# Patient Record
Sex: MALE | Race: WHITE | URBAN - METROPOLITAN AREA
[De-identification: names, ages, dates, MRNs, and addresses within clinical notes are randomized per-mention and may not be internally consistent; named-entity substitution may affect disease eponyms.]

---

## 2023-04-07 ENCOUNTER — OFFICE VISIT (OUTPATIENT)
Dept: FAMILY MEDICINE CLINIC | Age: 48
End: 2023-04-07

## 2023-04-09 PROBLEM — R09.81 NASAL CONGESTION: Status: ACTIVE | Noted: 2023-04-09

## 2023-04-09 PROBLEM — G25.2 RESTING TREMOR: Status: ACTIVE | Noted: 2023-04-09

## 2023-04-09 PROBLEM — R20.0 NUMBNESS AND TINGLING IN BOTH HANDS: Status: ACTIVE | Noted: 2023-04-09

## 2023-04-09 PROBLEM — R53.1 WEAKNESS: Status: ACTIVE | Noted: 2023-04-09

## 2023-04-09 PROBLEM — R20.2 NUMBNESS AND TINGLING IN BOTH HANDS: Status: ACTIVE | Noted: 2023-04-09

## 2023-05-01 ENCOUNTER — OFFICE VISIT (OUTPATIENT)
Dept: ENT CLINIC | Age: 48
End: 2023-05-01
Payer: COMMERCIAL

## 2023-05-01 VITALS
WEIGHT: 143 LBS | DIASTOLIC BLOOD PRESSURE: 89 MMHG | RESPIRATION RATE: 18 BRPM | BODY MASS INDEX: 18.95 KG/M2 | HEIGHT: 73 IN | SYSTOLIC BLOOD PRESSURE: 129 MMHG | HEART RATE: 105 BPM | OXYGEN SATURATION: 97 %

## 2023-05-01 DIAGNOSIS — J31.0 RHINITIS MEDICAMENTOSA: ICD-10-CM

## 2023-05-01 DIAGNOSIS — J34.2 DNS (DEVIATED NASAL SEPTUM): Primary | ICD-10-CM

## 2023-05-01 DIAGNOSIS — T48.5X5A RHINITIS MEDICAMENTOSA: ICD-10-CM

## 2023-05-01 PROCEDURE — 99203 OFFICE O/P NEW LOW 30 MIN: CPT | Performed by: OTOLARYNGOLOGY

## 2023-05-01 NOTE — PROGRESS NOTES
Subjective:    Tamanna Bermanr   56 y.o.   1975     New Patient Visit    History of Present Illness:    5/1/2023-[facility]    52-year-old male presents for chief complaint of nasal obstruction. Issues are primarily on the left side. This has been fairly lifelong. He does not complain of any rhinorrhea nor allergy type symptoms consistently. He has generally treated himself with phenylephrine nasal decongestant spray intermittently. No history of nasal trauma. He has had a fair amount of dental work done due to a history of crossbite. Review of Systems  Review of Systems   Constitutional:  Negative for chills and fever. HENT:  Positive for congestion. Negative for ear pain, hearing loss, nosebleeds and tinnitus. Eyes:  Negative for blurred vision and double vision. Respiratory:  Negative for cough, sputum production and shortness of breath. Cardiovascular:  Negative for chest pain and palpitations. Gastrointestinal:  Negative for heartburn, nausea and vomiting. Musculoskeletal:  Negative for joint pain and neck pain. Skin: Negative. Neurological:  Negative for dizziness, speech change, weakness and headaches. Endo/Heme/Allergies:  Negative for environmental allergies. Does not bruise/bleed easily. Psychiatric/Behavioral:  Negative for memory loss. The patient does not have insomnia. History reviewed. No pertinent past medical history. Past Surgical History:   Procedure Laterality Date    HX HERNIA REPAIR        Family History   Problem Relation Age of Onset    No Known Problems Mother     Hypertension Father      Social History     Tobacco Use    Smoking status: Never    Smokeless tobacco: Never   Substance Use Topics    Alcohol use: Never      Prior to Admission medications    Medication Sig Start Date End Date Taking? Authorizing Provider   metFORMIN (GLUCOPHAGE) 500 mg tablet Take 1 Tablet by mouth two (2) times daily (with meals).  4/14/23  Yes Zeeshan Duarte Yu MD        No Known Allergies      Objective:     Visit Vitals  /89 (BP 1 Location: Left upper arm, BP Patient Position: Sitting, BP Cuff Size: Adult)   Pulse (!) 105   Resp 18   Ht 6' 1\" (1.854 m)   Wt 143 lb (64.9 kg)   SpO2 97%   BMI 18.87 kg/m²        Physical Exam  Vitals reviewed. Constitutional:       General: He is awake. Appearance: Normal appearance. He is normal weight. HENT:      Head: Normocephalic and atraumatic. Jaw: There is normal jaw occlusion. No trismus, tenderness or malocclusion. Salivary Glands: Right salivary gland is not diffusely enlarged or tender. Left salivary gland is not diffusely enlarged or tender. Right Ear: Hearing, tympanic membrane, ear canal and external ear normal.      Left Ear: Hearing, tympanic membrane, ear canal and external ear normal.      Nose: Septal deviation (Severe inferior septum and maxillary crest dysfunction to the left side obstructing the entire left nasal floor) present. No mucosal edema or rhinorrhea. Right Turbinates: Not enlarged, swollen or pale. Left Turbinates: Not enlarged, swollen or pale. Right Sinus: No maxillary sinus tenderness or frontal sinus tenderness. Left Sinus: No maxillary sinus tenderness or frontal sinus tenderness. Mouth/Throat:      Lips: Pink. Mouth: Mucous membranes are moist. No oral lesions. Dentition: Abnormal dentition. No gum lesions. Tongue: No lesions. Palate: No mass and lesions. Pharynx: Oropharynx is clear. Uvula midline. Tonsils: No tonsillar exudate. 0 on the right. 0 on the left. Eyes:      General: Vision grossly intact. Extraocular Movements: Extraocular movements intact. Right eye: No nystagmus. Left eye: No nystagmus. Pupils: Pupils are equal, round, and reactive to light. Neck:      Thyroid: No thyroid mass, thyromegaly or thyroid tenderness.       Trachea: Trachea and phonation normal. No tracheal tenderness. Cardiovascular:      Rate and Rhythm: Normal rate and regular rhythm. Pulmonary:      Effort: Pulmonary effort is normal.      Breath sounds: Normal breath sounds. No stridor. No wheezing. Musculoskeletal:         General: Normal range of motion. Cervical back: Normal range of motion. No edema or erythema. Lymphadenopathy:      Cervical: No cervical adenopathy. Skin:     General: Skin is warm and dry. Neurological:      General: No focal deficit present. Mental Status: He is alert and oriented to person, place, and time. Mental status is at baseline. Coordination: Romberg sign negative. Gait: Gait is intact. Psychiatric:         Mood and Affect: Mood normal.         Behavior: Behavior normal. Behavior is cooperative. Assessment/Plan:     Encounter Diagnoses   Name Primary? DNS (deviated nasal septum) Yes    Rhinitis medicamentosa      Patient with obvious significant left nasal septal deviation which is in line with his clinical symptoms. He is a good candidate for septoplasty and he will likely proceed. I have instructed him that he should stop using nasal decongestant spray for potential surgery and he plans to quit using in the next 2 to 3 weeks. Discussed risks/benefits of septoplasty/turbinate procedure with patient, handout given. Risks include bleeding, infection, septum perforation, continued nasal blockage. Questions answered. Orders Placed This Encounter    REFERRAL TO SURGERY           Thank you for referring this patient,    Isidoro Crisostomo MD, [address] [facility]    [address]  [facility]    [address] [address]  [place], [place]

## 2023-05-02 ENCOUNTER — OFFICE VISIT (OUTPATIENT)
Dept: FAMILY MEDICINE CLINIC | Age: 48
End: 2023-05-02

## 2023-05-02 VITALS
RESPIRATION RATE: 18 BRPM | BODY MASS INDEX: 19.22 KG/M2 | DIASTOLIC BLOOD PRESSURE: 70 MMHG | WEIGHT: 145 LBS | HEIGHT: 73 IN | OXYGEN SATURATION: 97 % | SYSTOLIC BLOOD PRESSURE: 108 MMHG | TEMPERATURE: 98.2 F | HEART RATE: 72 BPM

## 2023-05-02 DIAGNOSIS — E11.65 TYPE 2 DIABETES MELLITUS WITH HYPERGLYCEMIA, WITHOUT LONG-TERM CURRENT USE OF INSULIN (HCC): Primary | ICD-10-CM

## 2023-05-02 DIAGNOSIS — R09.81 NASAL CONGESTION: ICD-10-CM

## 2023-05-02 DIAGNOSIS — G25.2 RESTING TREMOR: ICD-10-CM

## 2023-05-02 DIAGNOSIS — R29.898 WEAKNESS OF LEFT LOWER EXTREMITY: ICD-10-CM

## 2023-05-02 RX ORDER — AMOXICILLIN 500 MG/1
500 TABLET, FILM COATED ORAL
COMMUNITY

## 2023-05-02 RX ORDER — GLIPIZIDE 5 MG/1
5 TABLET ORAL 2 TIMES DAILY
Qty: 90 TABLET | Refills: 2 | Status: SHIPPED | OUTPATIENT
Start: 2023-05-02

## 2023-05-02 RX ORDER — METFORMIN HYDROCHLORIDE 1000 MG/1
1000 TABLET ORAL 2 TIMES DAILY WITH MEALS
Qty: 90 TABLET | Refills: 2 | Status: SHIPPED | OUTPATIENT
Start: 2023-05-02

## 2023-05-03 LAB
CREAT UR-MCNC: 58 MG/DL
MICROALBUMIN UR-MCNC: 0.53 MG/DL
MICROALBUMIN/CREAT UR-RTO: 9 MG/G (ref 0–30)

## 2023-05-05 LAB — GAD65 AB SER-ACNC: <5 U/ML (ref 0–5)

## 2023-05-19 ENCOUNTER — NURSE ONLY (OUTPATIENT)
Age: 48
End: 2023-05-19

## 2023-05-19 DIAGNOSIS — E11.65 TYPE 2 DIABETES MELLITUS WITH HYPERGLYCEMIA, WITHOUT LONG-TERM CURRENT USE OF INSULIN (HCC): Primary | ICD-10-CM

## 2023-05-23 ENCOUNTER — OFFICE VISIT (OUTPATIENT)
Age: 48
End: 2023-05-23

## 2023-05-23 VITALS
HEIGHT: 73 IN | TEMPERATURE: 98.3 F | HEART RATE: 83 BPM | WEIGHT: 141.8 LBS | OXYGEN SATURATION: 98 % | BODY MASS INDEX: 18.79 KG/M2 | DIASTOLIC BLOOD PRESSURE: 80 MMHG | SYSTOLIC BLOOD PRESSURE: 112 MMHG

## 2023-05-23 DIAGNOSIS — N52.01 ERECTILE DYSFUNCTION DUE TO ARTERIAL INSUFFICIENCY: ICD-10-CM

## 2023-05-23 DIAGNOSIS — E11.65 TYPE 2 DIABETES MELLITUS WITH HYPERGLYCEMIA, WITHOUT LONG-TERM CURRENT USE OF INSULIN (HCC): Primary | ICD-10-CM

## 2023-05-23 LAB — GLUCOSE DOSE-GTT, POC: 216

## 2023-05-23 RX ORDER — GLIPIZIDE 5 MG/1
5 TABLET ORAL 2 TIMES DAILY
COMMUNITY
Start: 2023-05-02

## 2023-05-23 RX ORDER — CHLORHEXIDINE GLUCONATE 0.12 MG/ML
RINSE ORAL
COMMUNITY
Start: 2023-05-01 | End: 2023-05-25 | Stop reason: ALTCHOICE

## 2023-05-23 RX ORDER — AMOXICILLIN 500 MG/1
TABLET, FILM COATED ORAL
COMMUNITY
Start: 2023-04-27 | End: 2023-05-25 | Stop reason: ALTCHOICE

## 2023-05-23 RX ORDER — HYDROCODONE BITARTRATE AND IBUPROFEN 7.5; 2 MG/1; MG/1
TABLET, FILM COATED ORAL
COMMUNITY
Start: 2023-04-20

## 2023-05-23 RX ORDER — SILDENAFIL 50 MG/1
50 TABLET, FILM COATED ORAL DAILY PRN
Qty: 30 TABLET | Refills: 0 | Status: SHIPPED | OUTPATIENT
Start: 2023-05-23

## 2023-05-23 SDOH — ECONOMIC STABILITY: INCOME INSECURITY: HOW HARD IS IT FOR YOU TO PAY FOR THE VERY BASICS LIKE FOOD, HOUSING, MEDICAL CARE, AND HEATING?: NOT HARD AT ALL

## 2023-05-23 SDOH — ECONOMIC STABILITY: FOOD INSECURITY: WITHIN THE PAST 12 MONTHS, YOU WORRIED THAT YOUR FOOD WOULD RUN OUT BEFORE YOU GOT MONEY TO BUY MORE.: NEVER TRUE

## 2023-05-23 SDOH — ECONOMIC STABILITY: FOOD INSECURITY: WITHIN THE PAST 12 MONTHS, THE FOOD YOU BOUGHT JUST DIDN'T LAST AND YOU DIDN'T HAVE MONEY TO GET MORE.: NEVER TRUE

## 2023-05-23 SDOH — ECONOMIC STABILITY: HOUSING INSECURITY
IN THE LAST 12 MONTHS, WAS THERE A TIME WHEN YOU DID NOT HAVE A STEADY PLACE TO SLEEP OR SLEPT IN A SHELTER (INCLUDING NOW)?: NO

## 2023-05-23 ASSESSMENT — PATIENT HEALTH QUESTIONNAIRE - PHQ9
SUM OF ALL RESPONSES TO PHQ QUESTIONS 1-9: 0
SUM OF ALL RESPONSES TO PHQ9 QUESTIONS 1 & 2: 0
2. FEELING DOWN, DEPRESSED OR HOPELESS: 0
SUM OF ALL RESPONSES TO PHQ QUESTIONS 1-9: 0
1. LITTLE INTEREST OR PLEASURE IN DOING THINGS: 0
SUM OF ALL RESPONSES TO PHQ QUESTIONS 1-9: 0
SUM OF ALL RESPONSES TO PHQ QUESTIONS 1-9: 0

## 2023-05-23 NOTE — PROGRESS NOTES
Identified pt with two pt identifiers(name and ). Reviewed record in preparation for visit and have obtained necessary documentation. Chief Complaint   Patient presents with    Diabetes        Health Maintenance Due   Topic    COVID-19 Vaccine (1)    Pneumococcal 0-64 years Vaccine (1 - PCV)    HIV screen     Hepatitis B vaccine (1 of 3 - Risk 3-dose series)    DTaP/Tdap/Td vaccine (1 - Tdap)    Colorectal Cancer Screen        Vitals:    23 1612   BP: 112/80   Site: Right Upper Arm   Position: Sitting   Cuff Size: Large Adult   Pulse: 83   Temp: 98.3 °F (36.8 °C)   TempSrc: Temporal   SpO2: 98%   Weight: 141 lb 12.8 oz (64.3 kg)   Height: 6' 1\" (1.854 m)        Coordination of Care Questionnaire:  :   1) Have you been to an emergency room, urgent care, or hospitalized since your last visit? If yes, where when, and reason for visit? No    2. Have seen or consulted any other health care provider since your last visit? If yes, where when, and reason for visit? No      3)   Patient is accompanied by self I have received verbal consent from Carmen Hewitt to discuss any/all medical information while they are present in the room.

## 2023-05-23 NOTE — PATIENT INSTRUCTIONS
- start checking your blood sugar daily. Check first thing in the morning and 1-2 hours after meals.   - record your these values in a log book and bring this with you to future appointments  - goal values are approximately  in the morning and less than 180 1-2 hours after eating.  - continue to work on diabetic diet goals  - work to eliminate concentrated sugar in your diet [facility] dew)

## 2023-05-23 NOTE — PROGRESS NOTES
SUBJECTIVE  Pito Mcdermott is an 56 y.o. male who presents for T2DM follow up. T2DM: Patient had metformin increased to 1,000mg BID and glipizide 5mg BID. - metformin: patient reports increased diarrhea with the increased dose. He is amenable to continuing the medication at this time  - glipizide: taking as prescribed without issue or concerns  - he has enrolled in a weekly group class with diabetes education  - POC glucose 216. Last ate at 12:30. Had mountain dew ~6oz prior to appointment. ED:  - patient reports gradually onset inability to maintain an erection  - he is amenable to trial of PDE5 inhibitor medication    Allergies   No Known Allergies      Medications   Current Outpatient Medications   Medication Sig    blood glucose monitor kit and supplies Dispense sufficient amount for indicated testing frequency plus additional to accommodate PRN testing needs. Dispense all needed supplies to include: monitor, strips, lancing device, lancets, control solutions, alcohol swabs. glipiZIDE (GLUCOTROL) 5 MG tablet Take 1 tablet by mouth 2 times daily    HYDROcodone-ibuprofen (VICOPROFEN) 7.5-200 MG per tablet TAKE 1 TABLET BY MOUTH EVERY 6 HOURS AS NEEDED FOR PAIN NOT TO EXCEED 5 TABLETS IN 24 HOURS    metFORMIN (GLUCOPHAGE) 1000 MG tablet Take 1 tablet by mouth 2 times daily (with meals)    sildenafil (VIAGRA) 50 MG tablet Take 1 tablet by mouth daily as needed for Erectile Dysfunction    amoxicillin (AMOXIL) 500 MG tablet TAKE 1 TABLET BY MOUTH FOUR TIMES A DAY UNTIL ALL ARE TAKEN (Patient not taking: Reported on 5/23/2023)    chlorhexidine (PERIDEX) 0.12 % solution RINSE MOUTH WITH 15ML (1 CAPFUL) FOR 30 SECONDS IN MORNING AND EVENING AFTER MEALS, THEN SPIT (Patient not taking: Reported on 5/23/2023)     No current facility-administered medications for this visit. Past surgical, medical and social history reviewed and updated as relevant to presenting concerns.      ROS: See HPI      OBJECTIVE  BP

## 2023-06-21 DIAGNOSIS — E11.65 TYPE 2 DIABETES MELLITUS WITH HYPERGLYCEMIA, WITHOUT LONG-TERM CURRENT USE OF INSULIN (HCC): Primary | ICD-10-CM

## 2023-06-21 NOTE — PROGRESS NOTES
Patient switched back to metformin 500mg from 1000mg BID secondary to GI side-effects. [employer] message sent to patient. Upcoming appointment with Dr. Bronwyn Carreon. Will touch base with Dr. Bronwyn Carreon with recommendation to consider starting insulin.     Nicky Hurtado MD

## 2023-06-23 ENCOUNTER — NURSE ONLY (OUTPATIENT)
Age: 48
End: 2023-06-23

## 2023-06-23 ENCOUNTER — HOSPITAL ENCOUNTER (OUTPATIENT)
Facility: HOSPITAL | Age: 48
Discharge: HOME | End: 2023-06-23
Payer: COMMERCIAL

## 2023-06-23 VITALS
HEIGHT: 72 IN | SYSTOLIC BLOOD PRESSURE: 110 MMHG | TEMPERATURE: 97.5 F | DIASTOLIC BLOOD PRESSURE: 80 MMHG | HEART RATE: 82 BPM | RESPIRATION RATE: 16 BRPM | WEIGHT: 140.21 LBS | BODY MASS INDEX: 18.99 KG/M2

## 2023-06-23 DIAGNOSIS — E11.65 TYPE 2 DIABETES MELLITUS WITH HYPERGLYCEMIA, WITHOUT LONG-TERM CURRENT USE OF INSULIN (HCC): Primary | ICD-10-CM

## 2023-06-23 LAB
ANION GAP SERPL CALC-SCNC: 5 MMOL/L (ref 5–15)
BUN SERPL-MCNC: 15 MG/DL (ref 6–20)
BUN/CREAT SERPL: 18 (ref 12–20)
CALCIUM SERPL-MCNC: 9.6 MG/DL (ref 8.5–10.1)
CHLORIDE SERPL-SCNC: 107 MMOL/L (ref 97–108)
CO2 SERPL-SCNC: 28 MMOL/L (ref 21–32)
CREAT SERPL-MCNC: 0.85 MG/DL (ref 0.7–1.3)
EKG ATRIAL RATE: 66 BPM
EKG DIAGNOSIS: NORMAL
EKG P AXIS: 58 DEGREES
EKG P-R INTERVAL: 178 MS
EKG Q-T INTERVAL: 372 MS
EKG QRS DURATION: 100 MS
EKG QTC CALCULATION (BAZETT): 389 MS
EKG R AXIS: 71 DEGREES
EKG T AXIS: 60 DEGREES
EKG VENTRICULAR RATE: 66 BPM
GLUCOSE SERPL-MCNC: 328 MG/DL (ref 65–100)
POTASSIUM SERPL-SCNC: 4.1 MMOL/L (ref 3.5–5.1)
SODIUM SERPL-SCNC: 140 MMOL/L (ref 136–145)

## 2023-06-23 PROCEDURE — 36415 COLL VENOUS BLD VENIPUNCTURE: CPT

## 2023-06-23 PROCEDURE — 80048 BASIC METABOLIC PNL TOTAL CA: CPT

## 2023-06-23 PROCEDURE — 93005 ELECTROCARDIOGRAM TRACING: CPT | Performed by: OTOLARYNGOLOGY

## 2023-06-23 RX ORDER — GLIPIZIDE 5 MG/1
5 TABLET, FILM COATED, EXTENDED RELEASE ORAL 2 TIMES DAILY
COMMUNITY

## 2023-06-23 RX ORDER — IBUPROFEN 200 MG
200 TABLET ORAL AS NEEDED
COMMUNITY
End: 2023-06-30

## 2023-06-23 NOTE — PROGRESS NOTES
[employer] [facility] Diabetes [employer]  Diabetes Self-Management Education & Support Program  Encounter Note      SUMMARY  Diabetes self-care management training was completed related to taking medications and physical activity. The participant will return on June 30 to continue DSMES related to healthy coping and problem solving. The participant did identify SMART Goal(s) and will practice knowledge and skills related to being active and medications to improve diabetes self-management. EVALUATION:  Participant's wife attended class today. Expressed understanding of diabetes medications, signs, symptoms, and treatment of hypoglycemia. Stated that glipizide was stopped and metformin dose was reduced. States understanding of the positive impact physical activity has on BG. Participated in physical activity during class. Stated that he is active around the house and gets in about 40 miles of steps at [employer]. RECOMMENDATIONS:  Continue working with provider on medication dosing  Continue current monitoring frequency  Consider adding structured physical activity discussed in class     TOPICS DISCUSSED TODAY:  HOW DO MY DIABETES MEDICATIONS WORK? [place]? 60      Next provider visit is scheduled for 6/30/23 with Dr. Paris Ricardo       SMART GOAL(S)  Consume 60g of CHO or less at breakfast Monday - Thursday of next week. ACHIEVEMENT OF GOAL(S) : %       DATE DSMES TOPIC EVALUATION     6/23/2023 HOW DO MY DIABETES MEDICATIONS WORK?    Type 1 medications & methods   Insulin injections   Injection sites   Type 2 medications   Oral agents   GLP-1 agonists   Hypoglycemia symptoms & treatment   Glucagon emergency kits   General guidance regarding insulin use whether Type 1, 2 or gestational diabetes   Storage of insulin   Disposal    Traveling with medications   Barriers to medication adherence      The participant   Can describe the expected action & side effects of

## 2023-06-23 NOTE — PERIOP NOTE
[address] [address] [facility]   MAIN OR                                  [phone]   MAIN PRE OP                          [phone]                                                                                AMBULATORY PRE OP          [phone]  PRE-ADMISSION TESTING    [phone]   Surgery Date:  Monday 7/3/23       Is surgery arrival time given by surgeon? YES  NO  If NO, [facility] staff will call you between 3 and 7pm the day before your surgery with your arrival time. (If your surgery is on a Monday, we will call you the Friday before.)    Call [phone] after 7pm Monday-Friday if you did not receive this call. INSTRUCTIONS BEFORE YOUR SURGERY   When You  Arrive Arrive at the 2nd [facility] on the day of your surgery  Have your insurance card, photo ID, and any copayment (if needed)   Food   and   Drink NO food or drink after midnight the night before surgery    This means NO water, gum, mints, coffee, juice, etc.  No alcohol (beer, wine, liquor) 24 hours before and after surgery   Medications to   TAKE   Morning of Surgery MEDICATIONS TO TAKE THE MORNING OF SURGERY WITH A SIP OF WATER:   Hold Sildenafil for 48 hours before surgery  Check your glucose in am day of surgery,if glucose is 80 you can take over the counter glucose tablets    Medications  To  STOP      7 days before surgery Non-Steroidal anti-inflammatory Drugs (NSAID's): for example, Ibuprofen (Advil, Motrin), Naproxen (Aleve)  Aspirin, if taking for pain   Herbal supplements, vitamins, and fish oil  Other:  (Pain medications not listed above, including Tylenol may be taken)   Blood  Thinners If you take  Aspirin, Plavix, Coumadin, or any blood-thinning or anti-blood clot medicine, talk to the doctor who prescribed the medications for pre-operative instructions.    Bathing Clothing  Jewelry  Valuables     If you shower the morning of surgery,

## 2023-06-26 ENCOUNTER — OFFICE VISIT (OUTPATIENT)
Age: 48
End: 2023-06-26
Payer: COMMERCIAL

## 2023-06-26 VITALS
HEIGHT: 72 IN | WEIGHT: 140 LBS | DIASTOLIC BLOOD PRESSURE: 78 MMHG | SYSTOLIC BLOOD PRESSURE: 118 MMHG | OXYGEN SATURATION: 97 % | HEART RATE: 108 BPM | RESPIRATION RATE: 17 BRPM | BODY MASS INDEX: 18.96 KG/M2

## 2023-06-26 DIAGNOSIS — J34.2 DEVIATED NASAL SEPTUM: Primary | ICD-10-CM

## 2023-06-26 DIAGNOSIS — J31.0 CHRONIC RHINITIS: ICD-10-CM

## 2023-06-26 PROCEDURE — 99213 OFFICE O/P EST LOW 20 MIN: CPT | Performed by: OTOLARYNGOLOGY

## 2023-06-30 ENCOUNTER — ANESTHESIA EVENT (OUTPATIENT)
Facility: HOSPITAL | Age: 48
End: 2023-06-30
Payer: COMMERCIAL

## 2023-06-30 ENCOUNTER — OFFICE VISIT (OUTPATIENT)
Age: 48
End: 2023-06-30
Payer: COMMERCIAL

## 2023-06-30 ENCOUNTER — NURSE ONLY (OUTPATIENT)
Age: 48
End: 2023-06-30

## 2023-06-30 VITALS
HEART RATE: 82 BPM | SYSTOLIC BLOOD PRESSURE: 109 MMHG | RESPIRATION RATE: 16 BRPM | TEMPERATURE: 98 F | OXYGEN SATURATION: 97 % | WEIGHT: 136 LBS | DIASTOLIC BLOOD PRESSURE: 70 MMHG | HEIGHT: 72 IN | BODY MASS INDEX: 18.42 KG/M2

## 2023-06-30 DIAGNOSIS — E11.65 TYPE 2 DIABETES MELLITUS WITH HYPERGLYCEMIA, WITHOUT LONG-TERM CURRENT USE OF INSULIN (HCC): Primary | ICD-10-CM

## 2023-06-30 LAB — HBA1C MFR BLD: 9.3 %

## 2023-06-30 PROCEDURE — 99214 OFFICE O/P EST MOD 30 MIN: CPT

## 2023-06-30 PROCEDURE — 83036 HEMOGLOBIN GLYCOSYLATED A1C: CPT

## 2023-06-30 PROCEDURE — 3046F HEMOGLOBIN A1C LEVEL >9.0%: CPT

## 2023-06-30 RX ORDER — PIOGLITAZONEHYDROCHLORIDE 15 MG/1
15 TABLET ORAL DAILY
Qty: 30 TABLET | Refills: 3 | Status: ON HOLD | OUTPATIENT
Start: 2023-06-30 | End: 2023-07-03 | Stop reason: HOSPADM

## 2023-06-30 RX ORDER — METFORMIN HYDROCHLORIDE 500 MG/1
1000 TABLET, EXTENDED RELEASE ORAL
Qty: 180 TABLET | Refills: 1 | Status: SHIPPED | OUTPATIENT
Start: 2023-06-30

## 2023-06-30 ASSESSMENT — ANXIETY QUESTIONNAIRES
GAD7 TOTAL SCORE: 3
4. TROUBLE RELAXING: 1
6. BECOMING EASILY ANNOYED OR IRRITABLE: 0
IF YOU CHECKED OFF ANY PROBLEMS ON THIS QUESTIONNAIRE, HOW DIFFICULT HAVE THESE PROBLEMS MADE IT FOR YOU TO DO YOUR WORK, TAKE CARE OF THINGS AT HOME, OR GET ALONG WITH OTHER PEOPLE: SOMEWHAT DIFFICULT
5. BEING SO RESTLESS THAT IT IS HARD TO SIT STILL: 0
3. WORRYING TOO MUCH ABOUT DIFFERENT THINGS: 1
2. NOT BEING ABLE TO STOP OR CONTROL WORRYING: 0
7. FEELING AFRAID AS IF SOMETHING AWFUL MIGHT HAPPEN: 0
1. FEELING NERVOUS, ANXIOUS, OR ON EDGE: 1

## 2023-06-30 ASSESSMENT — PATIENT HEALTH QUESTIONNAIRE - PHQ9
SUM OF ALL RESPONSES TO PHQ QUESTIONS 1-9: 10
5. POOR APPETITE OR OVEREATING: 0
8. MOVING OR SPEAKING SO SLOWLY THAT OTHER PEOPLE COULD HAVE NOTICED. OR THE OPPOSITE, BEING SO FIGETY OR RESTLESS THAT YOU HAVE BEEN MOVING AROUND A LOT MORE THAN USUAL: 0
SUM OF ALL RESPONSES TO PHQ9 QUESTIONS 1 & 2: 3
6. FEELING BAD ABOUT YOURSELF - OR THAT YOU ARE A FAILURE OR HAVE LET YOURSELF OR YOUR FAMILY DOWN: 2
SUM OF ALL RESPONSES TO PHQ QUESTIONS 1-9: 10
1. LITTLE INTEREST OR PLEASURE IN DOING THINGS: 2
10. IF YOU CHECKED OFF ANY PROBLEMS, HOW DIFFICULT HAVE THESE PROBLEMS MADE IT FOR YOU TO DO YOUR WORK, TAKE CARE OF THINGS AT HOME, OR GET ALONG WITH OTHER PEOPLE: 0
SUM OF ALL RESPONSES TO PHQ QUESTIONS 1-9: 10
2. FEELING DOWN, DEPRESSED OR HOPELESS: 1
4. FEELING TIRED OR HAVING LITTLE ENERGY: 3
3. TROUBLE FALLING OR STAYING ASLEEP: 2
SUM OF ALL RESPONSES TO PHQ QUESTIONS 1-9: 10
9. THOUGHTS THAT YOU WOULD BE BETTER OFF DEAD, OR OF HURTING YOURSELF: 0
7. TROUBLE CONCENTRATING ON THINGS, SUCH AS READING THE NEWSPAPER OR WATCHING TELEVISION: 0

## 2023-07-03 ENCOUNTER — HOSPITAL ENCOUNTER (OUTPATIENT)
Facility: HOSPITAL | Age: 48
Setting detail: OUTPATIENT SURGERY
Discharge: HOME | End: 2023-07-03
Attending: OTOLARYNGOLOGY | Admitting: OTOLARYNGOLOGY
Payer: COMMERCIAL

## 2023-07-03 ENCOUNTER — ANESTHESIA (OUTPATIENT)
Facility: HOSPITAL | Age: 48
End: 2023-07-03
Payer: COMMERCIAL

## 2023-07-03 VITALS
RESPIRATION RATE: 14 BRPM | HEIGHT: 72 IN | TEMPERATURE: 97.8 F | HEART RATE: 61 BPM | OXYGEN SATURATION: 100 % | BODY MASS INDEX: 18.3 KG/M2 | DIASTOLIC BLOOD PRESSURE: 85 MMHG | WEIGHT: 135.14 LBS | SYSTOLIC BLOOD PRESSURE: 145 MMHG

## 2023-07-03 DIAGNOSIS — R09.81 NASAL CONGESTION: Primary | ICD-10-CM

## 2023-07-03 LAB
GLUCOSE BLD STRIP.AUTO-MCNC: 142 MG/DL (ref 65–117)
GLUCOSE BLD STRIP.AUTO-MCNC: 159 MG/DL (ref 65–117)
SERVICE CMNT-IMP: ABNORMAL
SERVICE CMNT-IMP: ABNORMAL

## 2023-07-03 PROCEDURE — 2580000003 HC RX 258: Performed by: ANESTHESIOLOGY

## 2023-07-03 PROCEDURE — 3700000001 HC ADD 15 MINUTES (ANESTHESIA): Performed by: OTOLARYNGOLOGY

## 2023-07-03 PROCEDURE — 6360000002 HC RX W HCPCS: Performed by: OTOLARYNGOLOGY

## 2023-07-03 PROCEDURE — 6360000002 HC RX W HCPCS: Performed by: NURSE ANESTHETIST, CERTIFIED REGISTERED

## 2023-07-03 PROCEDURE — 2500000003 HC RX 250 WO HCPCS: Performed by: OTOLARYNGOLOGY

## 2023-07-03 PROCEDURE — 3700000000 HC ANESTHESIA ATTENDED CARE: Performed by: OTOLARYNGOLOGY

## 2023-07-03 PROCEDURE — 3600000013 HC SURGERY LEVEL 3 ADDTL 15MIN: Performed by: OTOLARYNGOLOGY

## 2023-07-03 PROCEDURE — 7100000010 HC PHASE II RECOVERY - FIRST 15 MIN: Performed by: OTOLARYNGOLOGY

## 2023-07-03 PROCEDURE — 82962 GLUCOSE BLOOD TEST: CPT

## 2023-07-03 PROCEDURE — 7100000011 HC PHASE II RECOVERY - ADDTL 15 MIN: Performed by: OTOLARYNGOLOGY

## 2023-07-03 PROCEDURE — 30802 ABLATE INF TURBINATE SUBMUC: CPT | Performed by: OTOLARYNGOLOGY

## 2023-07-03 PROCEDURE — 3600000003 HC SURGERY LEVEL 3 BASE: Performed by: OTOLARYNGOLOGY

## 2023-07-03 PROCEDURE — 2720000010 HC SURG SUPPLY STERILE: Performed by: OTOLARYNGOLOGY

## 2023-07-03 PROCEDURE — 2580000003 HC RX 258: Performed by: OTOLARYNGOLOGY

## 2023-07-03 PROCEDURE — 7100000001 HC PACU RECOVERY - ADDTL 15 MIN: Performed by: OTOLARYNGOLOGY

## 2023-07-03 PROCEDURE — 30520 REPAIR OF NASAL SEPTUM: CPT | Performed by: OTOLARYNGOLOGY

## 2023-07-03 PROCEDURE — 7100000000 HC PACU RECOVERY - FIRST 15 MIN: Performed by: OTOLARYNGOLOGY

## 2023-07-03 PROCEDURE — 2709999900 HC NON-CHARGEABLE SUPPLY: Performed by: OTOLARYNGOLOGY

## 2023-07-03 PROCEDURE — 2500000003 HC RX 250 WO HCPCS: Performed by: NURSE ANESTHETIST, CERTIFIED REGISTERED

## 2023-07-03 RX ORDER — DEXAMETHASONE SODIUM PHOSPHATE 4 MG/ML
INJECTION, SOLUTION INTRA-ARTICULAR; INTRALESIONAL; INTRAMUSCULAR; INTRAVENOUS; SOFT TISSUE PRN
Status: DISCONTINUED | OUTPATIENT
Start: 2023-07-03 | End: 2023-07-03 | Stop reason: SDUPTHER

## 2023-07-03 RX ORDER — SODIUM CHLORIDE, SODIUM LACTATE, POTASSIUM CHLORIDE, CALCIUM CHLORIDE 600; 310; 30; 20 MG/100ML; MG/100ML; MG/100ML; MG/100ML
INJECTION, SOLUTION INTRAVENOUS CONTINUOUS
Status: DISCONTINUED | OUTPATIENT
Start: 2023-07-03 | End: 2023-07-03 | Stop reason: HOSPADM

## 2023-07-03 RX ORDER — ROCURONIUM BROMIDE 10 MG/ML
INJECTION, SOLUTION INTRAVENOUS PRN
Status: DISCONTINUED | OUTPATIENT
Start: 2023-07-03 | End: 2023-07-03 | Stop reason: SDUPTHER

## 2023-07-03 RX ORDER — CEPHALEXIN 500 MG/1
500 CAPSULE ORAL 2 TIMES DAILY
Qty: 14 CAPSULE | Refills: 0 | Status: SHIPPED | OUTPATIENT
Start: 2023-07-03 | End: 2023-07-10

## 2023-07-03 RX ORDER — HYDROCODONE BITARTRATE AND ACETAMINOPHEN 5; 325 MG/1; MG/1
1 TABLET ORAL EVERY 4 HOURS PRN
Qty: 20 TABLET | Refills: 0 | Status: SHIPPED | OUTPATIENT
Start: 2023-07-03 | End: 2023-07-06

## 2023-07-03 RX ORDER — DIPHENHYDRAMINE HYDROCHLORIDE 50 MG/ML
12.5 INJECTION INTRAMUSCULAR; INTRAVENOUS
Status: DISCONTINUED | OUTPATIENT
Start: 2023-07-03 | End: 2023-07-03 | Stop reason: HOSPADM

## 2023-07-03 RX ORDER — ONDANSETRON 2 MG/ML
4 INJECTION INTRAMUSCULAR; INTRAVENOUS EVERY 6 HOURS PRN
Status: DISCONTINUED | OUTPATIENT
Start: 2023-07-03 | End: 2023-07-03 | Stop reason: HOSPADM

## 2023-07-03 RX ORDER — OXYCODONE HYDROCHLORIDE 5 MG/1
5 TABLET ORAL EVERY 4 HOURS PRN
Status: DISCONTINUED | OUTPATIENT
Start: 2023-07-03 | End: 2023-07-03 | Stop reason: HOSPADM

## 2023-07-03 RX ORDER — PROPOFOL 10 MG/ML
INJECTION, EMULSION INTRAVENOUS CONTINUOUS PRN
Status: DISCONTINUED | OUTPATIENT
Start: 2023-07-03 | End: 2023-07-03 | Stop reason: SDUPTHER

## 2023-07-03 RX ORDER — PROPOFOL 10 MG/ML
INJECTION, EMULSION INTRAVENOUS PRN
Status: DISCONTINUED | OUTPATIENT
Start: 2023-07-03 | End: 2023-07-03 | Stop reason: SDUPTHER

## 2023-07-03 RX ORDER — GLYCOPYRROLATE 0.2 MG/ML
INJECTION INTRAMUSCULAR; INTRAVENOUS PRN
Status: DISCONTINUED | OUTPATIENT
Start: 2023-07-03 | End: 2023-07-03 | Stop reason: SDUPTHER

## 2023-07-03 RX ORDER — FENTANYL CITRATE 50 UG/ML
INJECTION, SOLUTION INTRAMUSCULAR; INTRAVENOUS PRN
Status: DISCONTINUED | OUTPATIENT
Start: 2023-07-03 | End: 2023-07-03 | Stop reason: SDUPTHER

## 2023-07-03 RX ORDER — FENTANYL CITRATE 50 UG/ML
100 INJECTION, SOLUTION INTRAMUSCULAR; INTRAVENOUS
Status: DISCONTINUED | OUTPATIENT
Start: 2023-07-03 | End: 2023-07-03 | Stop reason: HOSPADM

## 2023-07-03 RX ORDER — ONDANSETRON 4 MG/1
4 TABLET, ORALLY DISINTEGRATING ORAL EVERY 8 HOURS PRN
Status: DISCONTINUED | OUTPATIENT
Start: 2023-07-03 | End: 2023-07-03 | Stop reason: HOSPADM

## 2023-07-03 RX ORDER — ACETAMINOPHEN 325 MG/1
650 TABLET ORAL EVERY 4 HOURS PRN
Status: DISCONTINUED | OUTPATIENT
Start: 2023-07-03 | End: 2023-07-03 | Stop reason: HOSPADM

## 2023-07-03 RX ORDER — LIDOCAINE HYDROCHLORIDE AND EPINEPHRINE BITARTRATE 20; .01 MG/ML; MG/ML
INJECTION, SOLUTION SUBCUTANEOUS PRN
Status: DISCONTINUED | OUTPATIENT
Start: 2023-07-03 | End: 2023-07-03 | Stop reason: HOSPADM

## 2023-07-03 RX ORDER — LIDOCAINE HYDROCHLORIDE 10 MG/ML
1 INJECTION, SOLUTION EPIDURAL; INFILTRATION; INTRACAUDAL; PERINEURAL
Status: DISCONTINUED | OUTPATIENT
Start: 2023-07-03 | End: 2023-07-03 | Stop reason: HOSPADM

## 2023-07-03 RX ORDER — LIDOCAINE HYDROCHLORIDE 20 MG/ML
INJECTION, SOLUTION EPIDURAL; INFILTRATION; INTRACAUDAL; PERINEURAL PRN
Status: DISCONTINUED | OUTPATIENT
Start: 2023-07-03 | End: 2023-07-03 | Stop reason: SDUPTHER

## 2023-07-03 RX ORDER — ONDANSETRON 2 MG/ML
INJECTION INTRAMUSCULAR; INTRAVENOUS PRN
Status: DISCONTINUED | OUTPATIENT
Start: 2023-07-03 | End: 2023-07-03 | Stop reason: SDUPTHER

## 2023-07-03 RX ORDER — SODIUM CHLORIDE 9 MG/ML
INJECTION, SOLUTION INTRAVENOUS PRN
Status: DISCONTINUED | OUTPATIENT
Start: 2023-07-03 | End: 2023-07-03 | Stop reason: HOSPADM

## 2023-07-03 RX ORDER — SODIUM CHLORIDE 0.9 % (FLUSH) 0.9 %
5-40 SYRINGE (ML) INJECTION PRN
Status: DISCONTINUED | OUTPATIENT
Start: 2023-07-03 | End: 2023-07-03 | Stop reason: HOSPADM

## 2023-07-03 RX ORDER — MIDAZOLAM HYDROCHLORIDE 2 MG/2ML
2 INJECTION, SOLUTION INTRAMUSCULAR; INTRAVENOUS
Status: DISCONTINUED | OUTPATIENT
Start: 2023-07-03 | End: 2023-07-03 | Stop reason: HOSPADM

## 2023-07-03 RX ORDER — DEXMEDETOMIDINE HYDROCHLORIDE 100 UG/ML
INJECTION, SOLUTION INTRAVENOUS PRN
Status: DISCONTINUED | OUTPATIENT
Start: 2023-07-03 | End: 2023-07-03 | Stop reason: SDUPTHER

## 2023-07-03 RX ORDER — PHENYLEPHRINE HCL IN 0.9% NACL 0.4MG/10ML
SYRINGE (ML) INTRAVENOUS PRN
Status: DISCONTINUED | OUTPATIENT
Start: 2023-07-03 | End: 2023-07-03 | Stop reason: SDUPTHER

## 2023-07-03 RX ORDER — MIDAZOLAM HYDROCHLORIDE 1 MG/ML
INJECTION INTRAMUSCULAR; INTRAVENOUS PRN
Status: DISCONTINUED | OUTPATIENT
Start: 2023-07-03 | End: 2023-07-03 | Stop reason: SDUPTHER

## 2023-07-03 RX ORDER — OXYCODONE HYDROCHLORIDE 5 MG/1
10 TABLET ORAL EVERY 4 HOURS PRN
Status: DISCONTINUED | OUTPATIENT
Start: 2023-07-03 | End: 2023-07-03 | Stop reason: HOSPADM

## 2023-07-03 RX ORDER — ONDANSETRON 2 MG/ML
4 INJECTION INTRAMUSCULAR; INTRAVENOUS
Status: DISCONTINUED | OUTPATIENT
Start: 2023-07-03 | End: 2023-07-03 | Stop reason: HOSPADM

## 2023-07-03 RX ORDER — NEOSTIGMINE METHYLSULFATE 1 MG/ML
INJECTION, SOLUTION INTRAVENOUS PRN
Status: DISCONTINUED | OUTPATIENT
Start: 2023-07-03 | End: 2023-07-03 | Stop reason: SDUPTHER

## 2023-07-03 RX ORDER — SODIUM CHLORIDE 0.9 % (FLUSH) 0.9 %
5-40 SYRINGE (ML) INJECTION EVERY 12 HOURS SCHEDULED
Status: DISCONTINUED | OUTPATIENT
Start: 2023-07-03 | End: 2023-07-03 | Stop reason: HOSPADM

## 2023-07-03 RX ADMIN — Medication 40 MCG: at 12:45

## 2023-07-03 RX ADMIN — GLYCOPYRROLATE 0.4 MG: 0.2 INJECTION INTRAMUSCULAR; INTRAVENOUS at 13:20

## 2023-07-03 RX ADMIN — ONDANSETRON HYDROCHLORIDE 4 MG: 2 SOLUTION INTRAMUSCULAR; INTRAVENOUS at 12:45

## 2023-07-03 RX ADMIN — DEXMEDETOMIDINE 10 MCG: 100 INJECTION, SOLUTION INTRAVENOUS at 13:11

## 2023-07-03 RX ADMIN — NEOSTIGMINE METHYLSULFATE 3 MG: 1 INJECTION, SOLUTION INTRAVENOUS at 13:20

## 2023-07-03 RX ADMIN — LIDOCAINE HYDROCHLORIDE 80 MG: 20 INJECTION, SOLUTION EPIDURAL; INFILTRATION; INTRACAUDAL; PERINEURAL at 12:36

## 2023-07-03 RX ADMIN — Medication 80 MCG: at 12:55

## 2023-07-03 RX ADMIN — DEXAMETHASONE SODIUM PHOSPHATE 8 MG: 4 INJECTION, SOLUTION INTRAMUSCULAR; INTRAVENOUS at 12:45

## 2023-07-03 RX ADMIN — Medication 80 MCG: at 13:16

## 2023-07-03 RX ADMIN — PROPOFOL 50 MCG/KG/MIN: 10 INJECTION, EMULSION INTRAVENOUS at 12:45

## 2023-07-03 RX ADMIN — ROCURONIUM BROMIDE 30 MG: 10 INJECTION INTRAVENOUS at 12:36

## 2023-07-03 RX ADMIN — MIDAZOLAM HYDROCHLORIDE 4 MG: 1 INJECTION, SOLUTION INTRAMUSCULAR; INTRAVENOUS at 12:27

## 2023-07-03 RX ADMIN — Medication 80 MCG: at 13:04

## 2023-07-03 RX ADMIN — DEXMEDETOMIDINE 10 MCG: 100 INJECTION, SOLUTION INTRAVENOUS at 12:47

## 2023-07-03 RX ADMIN — Medication 80 MCG: at 12:41

## 2023-07-03 RX ADMIN — FENTANYL CITRATE 150 MCG: 50 INJECTION, SOLUTION INTRAMUSCULAR; INTRAVENOUS at 12:36

## 2023-07-03 RX ADMIN — PROPOFOL 200 MG: 10 INJECTION, EMULSION INTRAVENOUS at 12:36

## 2023-07-03 RX ADMIN — SODIUM CHLORIDE, POTASSIUM CHLORIDE, SODIUM LACTATE AND CALCIUM CHLORIDE: 600; 310; 30; 20 INJECTION, SOLUTION INTRAVENOUS at 09:31

## 2023-07-03 RX ADMIN — CEFAZOLIN SODIUM 2000 MG: 1 POWDER, FOR SOLUTION INTRAMUSCULAR; INTRAVENOUS at 12:45

## 2023-07-03 ASSESSMENT — PAIN - FUNCTIONAL ASSESSMENT: PAIN_FUNCTIONAL_ASSESSMENT: 0-10

## 2023-07-03 ASSESSMENT — ENCOUNTER SYMPTOMS: SHORTNESS OF BREATH: 1

## 2023-07-03 NOTE — ANESTHESIA POSTPROCEDURE EVALUATION
Department of Anesthesiology  Postprocedure Note    Patient: Yane Adrian  MRN: [id number]  YOB: 1975  Date of evaluation: 7/3/2023      Procedure Summary     Date: 07/03/23 Room / Location: [facility]  / [facility]    Anesthesia Start: 1227 Anesthesia Stop: 1343    Procedure: SEPTOPLASTY, BILATERAL SUBMUCOUS RESECTION INFERIOR TURBINATES (Bilateral: Nose) Diagnosis:       Deviated nasal septum      Chronic rhinitis      (Deviated nasal septum [J34.2])      (Chronic rhinitis [J31.0])    Surgeons: Keli Rosen MD Responsible Provider: Navdeep Jackson DO    Anesthesia Type: General ASA Status: 3          Anesthesia Type: General    Feliz Phase I: Feliz Score: 9    Feliz Phase II:        Anesthesia Post Evaluation    Patient location during evaluation: PACU  Patient participation: complete - patient participated  Level of consciousness: awake  Pain score: 1 (Nose burning sensation)  Airway patency: patent  Nausea & Vomiting: no vomiting and no nausea  Complications: no  Cardiovascular status: hemodynamically stable  Respiratory status: acceptable  Hydration status: stable  Comments: Patient seen and examined. Ready for discharge from PACU.   Multimodal analgesia pain management approach

## 2023-07-03 NOTE — OP NOTE
Operative Note    Patient: Kya Brower  YOB: 1975  MRN: [id number]    Date of Procedure: 7/3/23     Pre-Op Diagnosis: Deviated nasal septum [J34.2]  Chronic rhinitis [J31.0]    Post-Op Diagnosis: Same as preoperative diagnosis. Procedure:  Septoplasty, bilateral submucous ablation inferior turbinates    Surgeon(s):  Michael Ludwig MD    Surgical Assistant: None    Anesthesia: General     Estimated Blood Loss (mL):  Minimal    Complications: None    Specimens: * No specimens in log *     Implants: * No implants in log *    Drains: * No LDAs found *    Findings: Refer to operative note    Indications: 50-year-old male presents with chronic nasal obstruction refractory to medical management. Clinical examination reveals severe deviation of nasal septum toward the left side. He is brought to the operating room for septoplasty and Coblation reduction of inferior turbinates. Detailed Description of Procedure:     Patient was brought to the operating room placed supine on the table. General endotracheal anesthesia was obtained and a timeout was performed. Patient was positioned and draped in the appropriate fashion for nasal surgery. A total of 8 mL of 1% lidocaine with 1-100,000 parts epinephrine was infiltrated into the nasal septal mucosa bilaterally. The nose was then packed bilaterally with oxymetazoline neuro patties. After several minutes the pledgets were removed and intranasal examination revealed significant left-sided nasal airway obstruction with deviation of the inferior mid septum. I made a hemitransfixion incision in the right nasal vestibule and then dissected the mucoperichondrial flap using [place] elevator exposing the deviation on the right side. A transcartilaginous incision was then made sparing approximately 1.5 to 2 cm of caudal septal strut. Mucoperichondrial flap was then elevated contralaterally helping to expose the leftward deviation.   Using a combination

## 2023-07-03 NOTE — INTERVAL H&P NOTE
Update History & Physical    H&P update    Patient examined at the bedside preoperatively. Heart -regular rate and rhythm, S1/S2  Lungs -clear to auscultation bilaterally    No other changes to prior H&P. Procedure again discussed in detail, risks and benefits explained, postoperative considerations discussed. All questions answered.       Electronically signed by Oliva Tinsley MD on 7/3/2023 at 12:08 PM

## 2023-07-10 ENCOUNTER — TELEPHONE (OUTPATIENT)
Age: 48
End: 2023-07-10

## 2023-07-10 ENCOUNTER — OFFICE VISIT (OUTPATIENT)
Age: 48
End: 2023-07-10

## 2023-07-10 VITALS
HEIGHT: 72 IN | RESPIRATION RATE: 17 BRPM | OXYGEN SATURATION: 98 % | SYSTOLIC BLOOD PRESSURE: 118 MMHG | WEIGHT: 135 LBS | DIASTOLIC BLOOD PRESSURE: 78 MMHG | HEART RATE: 117 BPM | BODY MASS INDEX: 18.28 KG/M2

## 2023-07-10 DIAGNOSIS — J34.2 DEVIATED NASAL SEPTUM: Primary | ICD-10-CM

## 2023-07-10 PROCEDURE — 99024 POSTOP FOLLOW-UP VISIT: CPT | Performed by: OTOLARYNGOLOGY

## 2023-07-10 NOTE — PROGRESS NOTES
Subjective:    Dinah Anderson   56 y.o.   1975     Follow-up visit    History of Present Illness:    5/1/2023-[facility]    43-year-old male presents for chief complaint of nasal obstruction. Issues are primarily on the left side. This has been fairly lifelong. He does not complain of any rhinorrhea nor allergy type symptoms consistently. He has generally treated himself with phenylephrine nasal decongestant spray intermittently. No history of nasal trauma. He has had a fair amount of dental work done due to a history of crossbite. 6/26/2023  6-week follow-up, patient is here to discuss septoplasty. Since his last visit he was diagnosed with diabetes, put on metformin and glipizide which she had difficulty tolerating high doses. His sugars were out of control for short time and now are back with better numbers for the past couple of days. 7/10/2023  He is 1 week postop septoplasty, still complaining of left nasal congestion    Review of Systems  Review of Systems   Constitutional:  Negative for chills and fever. HENT:  Positive for congestion. Negative for ear pain, hearing loss, nosebleeds and tinnitus. Eyes:  Negative for blurred vision and double vision. Respiratory:  Negative for cough, sputum production and shortness of breath. Cardiovascular:  Negative for chest pain and palpitations. Gastrointestinal:  Negative for heartburn, nausea and vomiting. Musculoskeletal:  Negative for joint pain and neck pain. Skin: Negative. Neurological:  Negative for dizziness, speech change, weakness and headaches. Endo/Heme/Allergies:  Negative for environmental allergies. Does not bruise/bleed easily. Psychiatric/Behavioral:  Negative for memory loss. The patient does not have insomnia. History reviewed. No pertinent past medical history.   Past Surgical History:   Procedure Laterality Date    HX HERNIA REPAIR        Family History   Problem Relation Age of Onset    No

## 2023-07-10 NOTE — TELEPHONE ENCOUNTER
Pt came in to request his prescription for pioglitazone (ACTOS) 15 MG tablet be sent to his pharmacy. Pt stated the pharmacy never received it. Pt's LOV was 06/30. Thank you!

## 2023-07-20 ENCOUNTER — TELEPHONE (OUTPATIENT)
Age: 48
End: 2023-07-20

## 2023-07-20 NOTE — TELEPHONE ENCOUNTER
----- Message from Emmanuel Casey sent at 6/30/2023  2:34 PM EDT -----  Regarding: Appointment  Follow up with Rl Gillespies in Sept

## 2023-07-20 NOTE — TELEPHONE ENCOUNTER
Attempted to reach patient to schedule a follow appointment for T2DM Follow Up with Dr. Yue Shen in [place]. Left vm for patient to call back to schedule.

## 2023-07-25 DIAGNOSIS — E11.65 TYPE 2 DIABETES MELLITUS WITH HYPERGLYCEMIA (HCC): ICD-10-CM

## 2023-07-26 RX ORDER — GLIPIZIDE 5 MG/1
TABLET ORAL
Qty: 60 TABLET | Refills: 4 | Status: SHIPPED | OUTPATIENT
Start: 2023-07-26

## 2023-08-11 ENCOUNTER — NURSE ONLY (OUTPATIENT)
Age: 48
End: 2023-08-11

## 2023-08-11 ENCOUNTER — OFFICE VISIT (OUTPATIENT)
Age: 48
End: 2023-08-11
Payer: COMMERCIAL

## 2023-08-11 ENCOUNTER — TELEPHONE (OUTPATIENT)
Age: 48
End: 2023-08-11

## 2023-08-11 VITALS
DIASTOLIC BLOOD PRESSURE: 69 MMHG | HEIGHT: 72 IN | SYSTOLIC BLOOD PRESSURE: 105 MMHG | WEIGHT: 129 LBS | OXYGEN SATURATION: 99 % | RESPIRATION RATE: 20 BRPM | BODY MASS INDEX: 17.47 KG/M2 | HEART RATE: 73 BPM

## 2023-08-11 DIAGNOSIS — E11.65 TYPE 2 DIABETES MELLITUS WITH HYPERGLYCEMIA, WITHOUT LONG-TERM CURRENT USE OF INSULIN (HCC): Primary | ICD-10-CM

## 2023-08-11 DIAGNOSIS — M62.81 MUSCLE WEAKNESS: Primary | ICD-10-CM

## 2023-08-11 DIAGNOSIS — M21.371 BILATERAL FOOT-DROP: ICD-10-CM

## 2023-08-11 DIAGNOSIS — M21.372 BILATERAL FOOT-DROP: ICD-10-CM

## 2023-08-11 DIAGNOSIS — M62.59 ATROPHY OF MUSCLE OF MULTIPLE SITES: ICD-10-CM

## 2023-08-11 DIAGNOSIS — G25.0 ESSENTIAL TREMOR: ICD-10-CM

## 2023-08-11 PROCEDURE — 99244 OFF/OP CNSLTJ NEW/EST MOD 40: CPT | Performed by: PSYCHIATRY & NEUROLOGY

## 2023-08-11 ASSESSMENT — PATIENT HEALTH QUESTIONNAIRE - PHQ9
1. LITTLE INTEREST OR PLEASURE IN DOING THINGS: 0
SUM OF ALL RESPONSES TO PHQ QUESTIONS 1-9: 1
SUM OF ALL RESPONSES TO PHQ9 QUESTIONS 1 & 2: 1
SUM OF ALL RESPONSES TO PHQ QUESTIONS 1-9: 1
2. FEELING DOWN, DEPRESSED OR HOPELESS: 1

## 2023-08-11 NOTE — TELEPHONE ENCOUNTER
Patient calling to schedule EMG and follow up appt.     He prefers a Friday in the afternoon for both

## 2023-08-11 NOTE — PROGRESS NOTES
[facility] and [address] at [facility] at [address], [address] [place] [address]  [phone] Office  [phone] Facsimile           Referring: Breonna Morrissey MD      Chief Complaint   Patient presents with    New Patient     Patient is here for tremors and numbness and tingling. Patient reports that about 5 months ago  numbness and tingling started  in left foot and leg. As for the tremors they are in his hands and head. They have been going on for the last past few years. 18-year-old gentleman presents today accompanied by his wife for neurologic consultation regarding numbness in his left leg as well as tremor. He tells me he noticed the tremor several years ago but over the last year it has gotten worse. His older brother has developed a tremor. This is an action type tremor. 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 His head will shake at times. If he gets anxious or upset it will get worse. He does not drink often enough to see if alcohol affects it. In addition he started to get numbness in his left leg and foot about 5 months ago. And he noticed that his left foot is weak and when he has to walk long distances he will drag the foot or even there has to take a higher step to keep it from dragging. He does not wear his shoes unevenly that he is noticed. He has some numbness in that left foot. He also has numbness in the right foot but no perceived weakness. He was recently diagnosed with diabetes back in April and the question has been raised of diabetic peripheral neuropathy. No family history of anything like this.     Laboratory analysis June 23, 2023  C7P 9.3  Metabolic panel with elevated glucose    Laboratory analysis April 7, 2023 elevated glucose  CBC unremarkable  Lipid panel with an LDL of

## 2023-08-11 NOTE — PATIENT INSTRUCTIONS
[address] [place] [address] Neuroscience Test Result Communication    Test results are available in [facility]. [employer] is the patient portal into our electronic health record. This feature allows patients to see diagnostic test results, immunizations, allergies, past medical and surgical history, current medications, and send messages directly to providers. Our team members at the  can provide additional information and assist with registration. The [employer] support team can be reached at [phone]. In some cases, a provider might need time to explain the results in detail during a follow-up appointment. This might include additional information or context that will help patients understand the reason for next steps in the plan of care recommended by their provider. If a patient chooses to receive diagnostic testing at an imaging center outside of the [facility], it is the patient's responsibility to bring the imaging report and disc to their [facility] follow-up appointment. If the test results reveal anything that is particularly noteworthy, we will contact you to discuss the matter and, if necessary, schedule a follow-up appointment at an earlier date. If you have not received your test results by [employer] or other communication within 7 days, please contact our office. An inquiry can be sent to your provider using [employer]. Alternatively, appointments can be scheduled via telephone to review results. If a follow-up appointment to review results has not been scheduled, [address] [facility] office can be reached at [phone]. For appointments at our [facility] or [facility] office, please call [phone]. [facility]   Statement to Patients  April 1, 2014      In an effort to ensure the large volume of patient prescription refills is processed in the most efficient and expeditious

## 2023-08-28 ENCOUNTER — OFFICE VISIT (OUTPATIENT)
Age: 48
End: 2023-08-28

## 2023-08-28 VITALS
HEIGHT: 72 IN | HEART RATE: 86 BPM | BODY MASS INDEX: 17.47 KG/M2 | OXYGEN SATURATION: 98 % | SYSTOLIC BLOOD PRESSURE: 110 MMHG | WEIGHT: 129 LBS | DIASTOLIC BLOOD PRESSURE: 80 MMHG | RESPIRATION RATE: 16 BRPM

## 2023-08-28 DIAGNOSIS — J34.2 DEVIATED NASAL SEPTUM: Primary | ICD-10-CM

## 2023-08-28 PROCEDURE — 99024 POSTOP FOLLOW-UP VISIT: CPT | Performed by: OTOLARYNGOLOGY

## 2023-09-15 ENCOUNTER — OFFICE VISIT (OUTPATIENT)
Age: 48
End: 2023-09-15
Payer: COMMERCIAL

## 2023-09-15 VITALS
DIASTOLIC BLOOD PRESSURE: 78 MMHG | BODY MASS INDEX: 18.07 KG/M2 | WEIGHT: 133.25 LBS | OXYGEN SATURATION: 96 % | SYSTOLIC BLOOD PRESSURE: 113 MMHG | HEART RATE: 78 BPM

## 2023-09-15 DIAGNOSIS — E11.65 TYPE 2 DIABETES MELLITUS WITH HYPERGLYCEMIA, WITHOUT LONG-TERM CURRENT USE OF INSULIN (HCC): Primary | ICD-10-CM

## 2023-09-15 PROCEDURE — 99213 OFFICE O/P EST LOW 20 MIN: CPT | Performed by: FAMILY MEDICINE

## 2023-09-15 PROCEDURE — 3046F HEMOGLOBIN A1C LEVEL >9.0%: CPT | Performed by: FAMILY MEDICINE

## 2023-09-15 SDOH — ECONOMIC STABILITY: FOOD INSECURITY: WITHIN THE PAST 12 MONTHS, YOU WORRIED THAT YOUR FOOD WOULD RUN OUT BEFORE YOU GOT MONEY TO BUY MORE.: NEVER TRUE

## 2023-09-15 SDOH — ECONOMIC STABILITY: INCOME INSECURITY: HOW HARD IS IT FOR YOU TO PAY FOR THE VERY BASICS LIKE FOOD, HOUSING, MEDICAL CARE, AND HEATING?: NOT HARD AT ALL

## 2023-09-15 SDOH — ECONOMIC STABILITY: FOOD INSECURITY: WITHIN THE PAST 12 MONTHS, THE FOOD YOU BOUGHT JUST DIDN'T LAST AND YOU DIDN'T HAVE MONEY TO GET MORE.: NEVER TRUE

## 2023-09-15 ASSESSMENT — PATIENT HEALTH QUESTIONNAIRE - PHQ9
SUM OF ALL RESPONSES TO PHQ QUESTIONS 1-9: 0
SUM OF ALL RESPONSES TO PHQ9 QUESTIONS 1 & 2: 0
SUM OF ALL RESPONSES TO PHQ QUESTIONS 1-9: 0
1. LITTLE INTEREST OR PLEASURE IN DOING THINGS: 0
2. FEELING DOWN, DEPRESSED OR HOPELESS: 0
SUM OF ALL RESPONSES TO PHQ QUESTIONS 1-9: 0
SUM OF ALL RESPONSES TO PHQ QUESTIONS 1-9: 0

## 2023-09-16 LAB
ANION GAP SERPL CALC-SCNC: 4 MMOL/L (ref 5–15)
BUN SERPL-MCNC: 19 MG/DL (ref 6–20)
BUN/CREAT SERPL: 24 (ref 12–20)
CALCIUM SERPL-MCNC: 9.3 MG/DL (ref 8.5–10.1)
CHLORIDE SERPL-SCNC: 109 MMOL/L (ref 97–108)
CO2 SERPL-SCNC: 28 MMOL/L (ref 21–32)
CREAT SERPL-MCNC: 0.78 MG/DL (ref 0.7–1.3)
EST. AVERAGE GLUCOSE BLD GHB EST-MCNC: 137 MG/DL
GLUCOSE SERPL-MCNC: 131 MG/DL (ref 65–100)
HBA1C MFR BLD: 6.4 % (ref 4–5.6)
POTASSIUM SERPL-SCNC: 4 MMOL/L (ref 3.5–5.1)
SODIUM SERPL-SCNC: 141 MMOL/L (ref 136–145)

## 2023-09-22 ENCOUNTER — PROCEDURE VISIT (OUTPATIENT)
Age: 48
End: 2023-09-22

## 2023-09-22 DIAGNOSIS — E11.65 TYPE 2 DIABETES MELLITUS WITH HYPERGLYCEMIA, WITHOUT LONG-TERM CURRENT USE OF INSULIN (HCC): ICD-10-CM

## 2023-09-22 DIAGNOSIS — R20.2 PARESTHESIA: Primary | ICD-10-CM

## 2023-09-25 RX ORDER — METFORMIN HYDROCHLORIDE 500 MG/1
1000 TABLET, EXTENDED RELEASE ORAL
Qty: 60 TABLET | Refills: 5 | OUTPATIENT
Start: 2023-09-25

## 2023-09-29 ENCOUNTER — TELEPHONE (OUTPATIENT)
Age: 48
End: 2023-09-29

## 2023-10-02 ENCOUNTER — OFFICE VISIT (OUTPATIENT)
Age: 48
End: 2023-10-02
Payer: COMMERCIAL

## 2023-10-02 VITALS
WEIGHT: 132 LBS | OXYGEN SATURATION: 94 % | RESPIRATION RATE: 16 BRPM | BODY MASS INDEX: 17.9 KG/M2 | DIASTOLIC BLOOD PRESSURE: 72 MMHG | SYSTOLIC BLOOD PRESSURE: 112 MMHG | HEART RATE: 83 BPM

## 2023-10-02 DIAGNOSIS — M21.372 BILATERAL FOOT-DROP: Primary | ICD-10-CM

## 2023-10-02 DIAGNOSIS — M21.371 BILATERAL FOOT-DROP: Primary | ICD-10-CM

## 2023-10-02 DIAGNOSIS — E11.65 TYPE 2 DIABETES MELLITUS WITH HYPERGLYCEMIA (HCC): ICD-10-CM

## 2023-10-02 PROCEDURE — 99214 OFFICE O/P EST MOD 30 MIN: CPT | Performed by: PSYCHIATRY & NEUROLOGY

## 2023-10-05 DIAGNOSIS — E11.65 TYPE 2 DIABETES MELLITUS WITH HYPERGLYCEMIA, WITHOUT LONG-TERM CURRENT USE OF INSULIN (HCC): Primary | ICD-10-CM

## 2023-10-09 DIAGNOSIS — E11.65 TYPE 2 DIABETES MELLITUS WITH HYPERGLYCEMIA, WITHOUT LONG-TERM CURRENT USE OF INSULIN (HCC): Primary | ICD-10-CM

## 2023-10-09 RX ORDER — METFORMIN HYDROCHLORIDE 500 MG/1
500 TABLET, EXTENDED RELEASE ORAL 2 TIMES DAILY
Qty: 90 TABLET | Refills: 3 | Status: SHIPPED | OUTPATIENT
Start: 2023-10-09

## 2023-10-13 ENCOUNTER — HOSPITAL ENCOUNTER (OUTPATIENT)
Facility: HOSPITAL | Age: 48
Discharge: HOME | End: 2023-10-13
Attending: PSYCHIATRY & NEUROLOGY
Payer: COMMERCIAL

## 2023-10-13 DIAGNOSIS — M21.371 BILATERAL FOOT-DROP: ICD-10-CM

## 2023-10-13 DIAGNOSIS — M21.372 BILATERAL FOOT-DROP: ICD-10-CM

## 2023-10-13 PROCEDURE — 72148 MRI LUMBAR SPINE W/O DYE: CPT

## 2023-10-31 ENCOUNTER — NURSE TRIAGE (OUTPATIENT)
Dept: OTHER | Facility: CLINIC | Age: 48
End: 2023-10-31

## 2023-10-31 NOTE — TELEPHONE ENCOUNTER
difficulty breathing (e.g., minimal/no SOB at rest, SOB with walking, pulse <100) AND [2] NEW-onset or WORSE than normal    Protocols used: Breathing Difficulty-ADULT-AH

## 2023-11-17 ENCOUNTER — TELEPHONE (OUTPATIENT)
Age: 48
End: 2023-11-17

## 2023-11-17 DIAGNOSIS — E11.65 TYPE 2 DIABETES MELLITUS WITH HYPERGLYCEMIA, WITHOUT LONG-TERM CURRENT USE OF INSULIN (HCC): ICD-10-CM

## 2023-11-17 RX ORDER — PIOGLITAZONEHYDROCHLORIDE 15 MG/1
15 TABLET ORAL DAILY
Qty: 90 TABLET | Refills: 3 | Status: SHIPPED | OUTPATIENT
Start: 2023-11-17

## 2023-11-17 NOTE — TELEPHONE ENCOUNTER
Hi Dr. Catalina Guy,    Patient is requesting a refill of his medication pioglitazone (ACTOS) 15 MG tablet. He will be out of this medication in a week.  Please send to the pharmacy:         [facility]/pharmacy #[id number] [id number] [facility] [phone] [address] [facility], [address] [place] [address]   Phone:  [phone]  Fax:  [phone]

## 2023-12-27 DIAGNOSIS — E11.65 TYPE 2 DIABETES MELLITUS WITH HYPERGLYCEMIA (HCC): ICD-10-CM

## 2023-12-27 RX ORDER — GLIPIZIDE 5 MG/1
5 TABLET ORAL 2 TIMES DAILY
Qty: 180 TABLET | Refills: 3 | Status: SHIPPED | OUTPATIENT
Start: 2023-12-27

## 2024-02-21 DIAGNOSIS — E11.65 TYPE 2 DIABETES MELLITUS WITH HYPERGLYCEMIA, WITHOUT LONG-TERM CURRENT USE OF INSULIN (HCC): ICD-10-CM

## 2024-03-08 RX ORDER — METFORMIN HYDROCHLORIDE 500 MG/1
TABLET, EXTENDED RELEASE ORAL
Qty: 180 TABLET | Refills: 3 | Status: SHIPPED | OUTPATIENT
Start: 2024-03-08

## 2024-04-19 ENCOUNTER — OFFICE VISIT (OUTPATIENT)
Age: 49
End: 2024-04-19
Payer: COMMERCIAL

## 2024-04-19 DIAGNOSIS — E11.42 DIABETIC POLYNEUROPATHY ASSOCIATED WITH TYPE 2 DIABETES MELLITUS (HCC): ICD-10-CM

## 2024-04-19 DIAGNOSIS — M48.061 SPINAL STENOSIS OF LUMBAR REGION, UNSPECIFIED WHETHER NEUROGENIC CLAUDICATION PRESENT: ICD-10-CM

## 2024-04-19 DIAGNOSIS — M21.372 BILATERAL FOOT-DROP: ICD-10-CM

## 2024-04-19 DIAGNOSIS — M21.371 BILATERAL FOOT-DROP: ICD-10-CM

## 2024-04-19 DIAGNOSIS — Z12.11 ENCOUNTER FOR SCREENING FOR MALIGNANT NEOPLASM OF COLON: ICD-10-CM

## 2024-04-19 DIAGNOSIS — E11.65 TYPE 2 DIABETES MELLITUS WITH HYPERGLYCEMIA, WITHOUT LONG-TERM CURRENT USE OF INSULIN (HCC): Primary | ICD-10-CM

## 2024-04-19 PROCEDURE — 99214 OFFICE O/P EST MOD 30 MIN: CPT

## 2024-04-19 PROCEDURE — 3044F HG A1C LEVEL LT 7.0%: CPT

## 2024-04-19 ASSESSMENT — PATIENT HEALTH QUESTIONNAIRE - PHQ9
SUM OF ALL RESPONSES TO PHQ9 QUESTIONS 1 & 2: 0
SUM OF ALL RESPONSES TO PHQ QUESTIONS 1-9: 0
SUM OF ALL RESPONSES TO PHQ QUESTIONS 1-9: 0
2. FEELING DOWN, DEPRESSED OR HOPELESS: NOT AT ALL
SUM OF ALL RESPONSES TO PHQ QUESTIONS 1-9: 0
SUM OF ALL RESPONSES TO PHQ QUESTIONS 1-9: 0
1. LITTLE INTEREST OR PLEASURE IN DOING THINGS: NOT AT ALL

## 2024-04-20 LAB
ANION GAP SERPL CALC-SCNC: 3 MMOL/L (ref 5–15)
BUN SERPL-MCNC: 18 MG/DL (ref 6–20)
BUN/CREAT SERPL: 21 (ref 12–20)
CALCIUM SERPL-MCNC: 9.9 MG/DL (ref 8.5–10.1)
CHLORIDE SERPL-SCNC: 108 MMOL/L (ref 97–108)
CHOLEST SERPL-MCNC: 162 MG/DL
CO2 SERPL-SCNC: 28 MMOL/L (ref 21–32)
CREAT SERPL-MCNC: 0.87 MG/DL (ref 0.7–1.3)
CREAT UR-MCNC: 84.5 MG/DL
ERYTHROCYTE [DISTWIDTH] IN BLOOD BY AUTOMATED COUNT: 12.6 % (ref 11.5–14.5)
EST. AVERAGE GLUCOSE BLD GHB EST-MCNC: 117 MG/DL
GLUCOSE SERPL-MCNC: 110 MG/DL (ref 65–100)
HBA1C MFR BLD: 5.7 % (ref 4–5.6)
HCT VFR BLD AUTO: 41.7 % (ref 36.6–50.3)
HDLC SERPL-MCNC: 57 MG/DL
HDLC SERPL: 2.8 (ref 0–5)
HGB BLD-MCNC: 14.8 G/DL (ref 12.1–17)
LDLC SERPL CALC-MCNC: 88.6 MG/DL (ref 0–100)
MCH RBC QN AUTO: 31.9 PG (ref 26–34)
MCHC RBC AUTO-ENTMCNC: 35.5 G/DL (ref 30–36.5)
MCV RBC AUTO: 89.9 FL (ref 80–99)
MICROALBUMIN UR-MCNC: <0.5 MG/DL
MICROALBUMIN/CREAT UR-RTO: <6 MG/G (ref 0–30)
NRBC # BLD: 0 K/UL (ref 0–0.01)
NRBC BLD-RTO: 0 PER 100 WBC
PLATELET # BLD AUTO: 167 K/UL (ref 150–400)
PMV BLD AUTO: 10.7 FL (ref 8.9–12.9)
POTASSIUM SERPL-SCNC: 3.7 MMOL/L (ref 3.5–5.1)
RBC # BLD AUTO: 4.64 M/UL (ref 4.1–5.7)
SODIUM SERPL-SCNC: 139 MMOL/L (ref 136–145)
TRIGL SERPL-MCNC: 82 MG/DL
VLDLC SERPL CALC-MCNC: 16.4 MG/DL
WBC # BLD AUTO: 4.7 K/UL (ref 4.1–11.1)

## 2024-04-22 NOTE — PROGRESS NOTES
David Landin is a 48 y.o. male    Chief Complaint   Patient presents with    Diabetes       \"Have you been to the ER, urgent care clinic since your last visit?  Hospitalized since your last visit?\"    NO    “Have you seen or consulted any other health care providers outside of [facility] since your last visit?”    NO    “Have you had a colorectal cancer screening such as a colonoscopy/FIT/Cologuard?    NO    No colonoscopy on file  No cologuard on file  No FIT/FOBT on file   No flexible sigmoidoscopy on file               There were no vitals filed for this visit.       No data to display              Health Maintenance Due   Topic Date Due    Hepatitis B vaccine (1 of 3 - 3-dose series) Never done    COVID-19 Vaccine (1) Never done    Pneumococcal 0-64 years Vaccine (1 of 2 - PCV) Never done    HIV screen  Never done    DTaP/Tdap/Td vaccine (1 - Tdap) Never done    Colorectal Cancer Screen  Never done    Lipids  04/07/2024    Diabetic Alb to Cr ratio (uACR) test  05/03/2024       
I saw and evaluated the patient, performing the key elements of the service on the date of service.  I discussed the findings, assessment and plan with the resident and agree with the resident's findings and plan as documented in the resident's note.     Loida Alfonso MD 4/22/2024   
normal  Nose - normal and patent, no erythema, discharge or polyps  Mouth - mucous membranes moist, pharynx normal without lesions  Neck - supple, no significant adenopathy  Chest - clear to auscultation, no wheezes, rales or rhonchi, symmetric air entry  Heart - normal rate, regular rhythm, normal S1, S2, no murmurs, rubs, clicks or gallops  Neurological - alert, oriented, normal speech, resting fine tremor  Extremities - peripheral pulses normal, no pedal edema, no clubbing or cyanosis  Skin - normal coloration and turgor, no rashes, no suspicious skin lesions noted      ASSESSMENT & PLAN  1. Type 2 diabetes mellitus with hyperglycemia, without long-term current use of insulin (HCC): Chronic, well controlled. Will repeat A1c today to monitor.  - continue current medications: metformin, actos, and glipizide  - Lipid Panel  - Basic Metabolic Panel  - Hemoglobin A1C  - CBC  - Microalbumin / Creatinine Urine Ratio    2. Diabetic polyneuropathy associated with type 2 diabetes mellitus (HCC): As seen on EMG per neurology. Asymptomatic with good diabetic control as above    3. Bilateral foot-drop: Chronic, unchanged. Follows with neurology and is s/p EMG that showed bilateral polyneuropathy. MRI showed moderate degenerative edema to anterior L2-3 disc space and mild spondylosis  - encouraged patient to follow back up with neurology  - External Referral To Orthopedic Surgery for evaluation of MRI findings    4. Spinal stenosis of lumbar region, unspecified whether neurogenic claudication present  - External Referral To Orthopedic Surgery    5. Encounter for screening for malignant neoplasm of colon  - [facility] - Bandar Jade MD, Gastroenterology, [place]      Return in about 6 months (around 10/19/2024), or if symptoms worsen or fail to improve.      I have discussed the diagnosis with the patient and the intended plan as seen in the above orders. Patient verbalized understanding of the plan and agrees with the plan. The

## 2024-07-09 ENCOUNTER — OFFICE VISIT (OUTPATIENT)
Age: 49
End: 2024-07-09
Payer: COMMERCIAL

## 2024-07-09 VITALS
SYSTOLIC BLOOD PRESSURE: 124 MMHG | DIASTOLIC BLOOD PRESSURE: 80 MMHG | RESPIRATION RATE: 17 BRPM | HEART RATE: 87 BPM | OXYGEN SATURATION: 98 % | BODY MASS INDEX: 17.88 KG/M2 | HEIGHT: 72 IN | WEIGHT: 132 LBS

## 2024-07-09 DIAGNOSIS — J34.89: ICD-10-CM

## 2024-07-09 DIAGNOSIS — J34.3 HYPERTROPHY OF BOTH INFERIOR NASAL TURBINATES: ICD-10-CM

## 2024-07-09 DIAGNOSIS — J34.2 DEVIATED NASAL SEPTUM: Primary | ICD-10-CM

## 2024-07-09 DIAGNOSIS — R06.02 SHORTNESS OF BREATH: ICD-10-CM

## 2024-07-09 PROCEDURE — 99213 OFFICE O/P EST LOW 20 MIN: CPT | Performed by: OTOLARYNGOLOGY

## 2024-07-09 PROCEDURE — 31575 DIAGNOSTIC LARYNGOSCOPY: CPT | Performed by: OTOLARYNGOLOGY

## 2024-07-09 RX ORDER — FLUTICASONE PROPIONATE 50 MCG
1 SPRAY, SUSPENSION (ML) NASAL DAILY
Qty: 32 G | Refills: 1 | Status: SHIPPED | OUTPATIENT
Start: 2024-07-09

## 2024-07-09 NOTE — PROGRESS NOTES
Subjective:    David Landin   47 y.o.   1975     Follow-up visit    History of Present Illness:    5/1/2023-[facility]    47-year-old male presents for chief complaint of nasal obstruction.  Issues are primarily on the left side.  This has been fairly lifelong.  He does not complain of any rhinorrhea nor allergy type symptoms consistently.  He has generally treated himself with phenylephrine nasal decongestant spray intermittently.  No history of nasal trauma.  He has had a fair amount of dental work done due to a history of crossbite.    6/26/2023  6-week follow-up, patient is here to discuss septoplasty.  Since his last visit he was diagnosed with diabetes, put on metformin and glipizide which she had difficulty tolerating high doses.  His sugars were out of control for short time and now are back with better numbers for the past couple of days.    7/10/2023  He is 1 week postop septoplasty, still complaining of left nasal congestion    8/28/2023  6-week follow-up, he is doing better now, left nasal breathing is much improved.  He still has some shortness of breath on exertion sometimes at work, he is not sure if this is coming from another issue with his breathing or something else causing fatigue    7/9/24  1 year followup - pt states he is doing much better overall, but at times usually at work he c/o intermittent right sided nasal fullness, going into his throat and even has tough time mouth breathing, feels like not getting enough air into his lungs.  Using afrin 3-4x week.  Not using any flonase as of now.    Review of Systems  Review of Systems   Constitutional:  Negative for chills and fever.   HENT:  Positive for congestion. Negative for ear pain, hearing loss, nosebleeds and tinnitus.    Eyes:  Negative for blurred vision and double vision.   Respiratory:  Negative for cough, sputum production and shortness of breath.    Cardiovascular:  Negative for chest pain and palpitations.

## 2024-08-20 DIAGNOSIS — R06.00 DYSPNEA, UNSPECIFIED TYPE: Primary | ICD-10-CM

## 2024-11-10 DIAGNOSIS — E11.65 TYPE 2 DIABETES MELLITUS WITH HYPERGLYCEMIA, WITHOUT LONG-TERM CURRENT USE OF INSULIN (HCC): ICD-10-CM

## 2024-11-11 RX ORDER — PIOGLITAZONEHYDROCHLORIDE 15 MG/1
15 TABLET ORAL DAILY
Qty: 90 TABLET | Refills: 0 | Status: SHIPPED | OUTPATIENT
Start: 2024-11-11

## 2024-11-11 NOTE — TELEPHONE ENCOUNTER
Medication Refill Request    David Landin is requesting a refill of the following medication(s):   Requested Prescriptions     Pending Prescriptions Disp Refills    pioglitazone (ACTOS) 15 MG tablet [Pharmacy Med Name: PIOGLITAZONE HCL 15 MG TABLET] 90 tablet 3     Sig: TAKE 1 TABLET BY MOUTH EVERY DAY        Listed PCP is Anibal Holland MD   Last provider to prescribe medication: Skyler  Last Date of Medication Prescribed: 11/17/2023   Date of Last Office Visit at [facility]: 4/19/24   FUTURE APPOINTMENT:   Future Appointments   Date Time Provider Department Center   1/8/2025  8:45 AM Adolfo Hogan MD REP BS AMB       Please send refill to:    [facility]/pharmacy #[id number] - [place], [place] - [id number] [place] [address] RD - P [phone] - F [phone]  [address] [place] [address]  Phone: [phone] Fax: [phone]    Please review request and approve or deny with recommendations.

## 2024-11-22 ENCOUNTER — OFFICE VISIT (OUTPATIENT)
Age: 49
End: 2024-11-22

## 2024-11-22 VITALS
HEART RATE: 66 BPM | HEIGHT: 72 IN | WEIGHT: 154 LBS | OXYGEN SATURATION: 99 % | TEMPERATURE: 97.7 F | RESPIRATION RATE: 18 BRPM | DIASTOLIC BLOOD PRESSURE: 77 MMHG | BODY MASS INDEX: 20.86 KG/M2 | SYSTOLIC BLOOD PRESSURE: 117 MMHG

## 2024-11-22 DIAGNOSIS — E11.65 TYPE 2 DIABETES MELLITUS WITH HYPERGLYCEMIA, WITHOUT LONG-TERM CURRENT USE OF INSULIN (HCC): Primary | ICD-10-CM

## 2024-11-22 DIAGNOSIS — M21.372 BILATERAL FOOT-DROP: ICD-10-CM

## 2024-11-22 DIAGNOSIS — M21.371 BILATERAL FOOT-DROP: ICD-10-CM

## 2024-11-22 LAB — HBA1C MFR BLD: 6 %

## 2024-11-22 ASSESSMENT — PATIENT HEALTH QUESTIONNAIRE - PHQ9
1. LITTLE INTEREST OR PLEASURE IN DOING THINGS: SEVERAL DAYS
SUM OF ALL RESPONSES TO PHQ QUESTIONS 1-9: 2
2. FEELING DOWN, DEPRESSED OR HOPELESS: SEVERAL DAYS
SUM OF ALL RESPONSES TO PHQ9 QUESTIONS 1 & 2: 2

## 2024-11-22 NOTE — PROGRESS NOTES
David Landin is a 49 y.o. male      Chief Complaint   Patient presents with    Diabetes     Patient is coming in for diabetes follow up. Patient is asking for new pulmonologist referral. No other concerns.        \"Have you been to the ER, urgent care clinic since your last visit?  Hospitalized since your last visit?\"    NO    “Have you seen or consulted any other health care providers outside of [facility] since your last visit?”    NO      Vitals:    11/22/24 1423   BP: 117/77   Site: Right Upper Arm   Position: Sitting   Pulse: 66   Resp: 18   Temp: 97.7 °F (36.5 °C)   TempSrc: Oral   SpO2: 99%   Weight: 69.9 kg (154 lb)   Height: 1.829 m (6')            Health Maintenance Due   Topic Date Due    Pneumococcal 0-64 years Vaccine (1 of 2 - PCV) Never done    HIV screen  Never done    Hepatitis B vaccine (1 of 3 - 19+ 3-dose series) Never done    DTaP/Tdap/Td vaccine (1 - Tdap) Never done    Colorectal Cancer Screen  Never done    Diabetic foot exam  05/23/2024    Flu vaccine (1) Never done    COVID-19 Vaccine (1 - 2023-24 season) Never done    Diabetic retinal exam  09/09/2024         Medication Reconciliation completed, changes noted.  Please  Update medication list.   
I saw and evaluated the patient, performing the key elements of the service.  I discussed the findings, assessment and plan with the resident and agree with the resident's findings and plan as documented in the resident's note.    
daily     No current facility-administered medications for this visit.         Past surgical, medical and social history reviewed and updated as relevant to presenting concerns.     ROS: See HPI      OBJECTIVE  /77 (Site: Right Upper Arm, Position: Sitting)   Pulse 66   Temp 97.7 °F (36.5 °C) (Oral)   Resp 18   Ht 1.829 m (6')   Wt 69.9 kg (154 lb)   SpO2 99%   BMI 20.89 kg/m²     Physical Exam  General appearance - alert, well appearing, and in no distress  Eyes - sclera anicteric  Mouth - mucous membranes moist, pharynx normal without lesions  Neck - supple, no significant adenopathy  Chest - clear to auscultation, no wheezes, rales or rhonchi, symmetric air entry  Heart - normal rate, regular rhythm, normal S1, S2, no murmurs, rubs, clicks or gallops  Skin - normal coloration and turgor, no rashes, no suspicious skin lesions noted      ASSESSMENT & PLAN  1. Type 2 diabetes mellitus with hyperglycemia, without long-term current use of insulin (HCC): POC A1c today of 6%. Chronic and well controlled. Will discontinue actos secondary to side-effect profile. Advised patient to only take glipizide with meals to help avoid lows. If repeat A1c in 3-4 months is still well controlled, will consider discontinuation or reduction in glipizide. Encouraged ongoing diabetic diet compliance.  - AMB POC HEMOGLOBIN A1C  - will also obtain lipid panel at follow up lab appointment. Consider addition of statin if persistently elevated over 70    2. Bilateral foot-drop: Chronic, unchanged. Referral to PT per prior ortho recommendation and patient request.  - [facility] - Physical Therapy at [facility]        Return in about 3 months (around 2/22/2025) for repeat a1c check - lab appointment for POC a1c.      I have discussed the diagnosis with the patient and the intended plan as seen in the above orders. Patient verbalized understanding of the plan and agrees with the plan. The patient has received an after-visit

## 2024-11-22 NOTE — PATIENT INSTRUCTIONS
- Please call to schedule an appointment with [facility]. They many different offices. The number for the [place] office is [phone]

## 2024-12-09 ENCOUNTER — HOSPITAL ENCOUNTER (OUTPATIENT)
Facility: HOSPITAL | Age: 49
Setting detail: RECURRING SERIES
Discharge: HOME | End: 2024-12-12
Payer: COMMERCIAL

## 2024-12-09 PROCEDURE — 97110 THERAPEUTIC EXERCISES: CPT | Performed by: PHYSICAL THERAPIST

## 2024-12-09 PROCEDURE — 97162 PT EVAL MOD COMPLEX 30 MIN: CPT | Performed by: PHYSICAL THERAPIST

## 2024-12-09 PROCEDURE — 97535 SELF CARE MNGMENT TRAINING: CPT | Performed by: PHYSICAL THERAPIST

## 2024-12-09 NOTE — THERAPY EVALUATION
09287 Therapeutic Activity, 88207 Self Care/Home Management, 91018 Electrical Stim unattended /  (MCR), 73143 Electrical Stim attended, 27622 Gait Training, 85442 Ultrasound, 19830 Mechanical Traction, and (Elective Self Pay) Needle Insertion w/o Injection (1 or 2 muscles), (3+ muscles)  Patient / Family readiness to learn indicated by: asking questions and trying to perform skills  Persons(s) to be included in education: patient (P)  Barriers to Learning/Limitations: none  Measures taken if barriers to learning present:   Patient Self Reported Health Status: good  Rehabilitation Potential: good    Short Term Goals: To be accomplished in 12 treatments.  Patient will be ind with Hep without VC  Patient will be able to demonstrate improvement in ankle DF strength to 3/5 < 2/10 pain in order to improve gait mechanics to walk for 30 mins without need for rest break   Patient will be able to demonstrate improvement in ankle DF AROM to -10 < 2/10  pain in order to improve upon ability to walk up stairs  Pt will be able to demonstrate SL stance for 10s without HHA in order to improve stability with dressing      Long Term Goals: To be accomplished in 24 treatments.  Patient will be able to sit <> stand x5  without HHA or excessive compensation in order to improve mobility with ADLs  Patient will be able to demonstrate improvement in ankle inv and DF strength to 4/5 in order to perform work duties without AFO    Pt will be able to walk for 1 hour without pain or need from rest   Pt will be able to demonstrate improvement in AMADO balance score to demonstrate reduced risk of falls.         Frequency / Duration: Patient to be seen 1-2 times per week for 24 treatments.    Patient/ Caregiver education and instruction: Diagnosis, prognosis, self care, activity modification, and exercises   [x]  Plan of care has been reviewed with PTA      Certification Period: DAVID Warren, PT, DPT      12/9/2024       8:41

## 2024-12-16 ENCOUNTER — HOSPITAL ENCOUNTER (OUTPATIENT)
Facility: HOSPITAL | Age: 49
Setting detail: RECURRING SERIES
Discharge: HOME | End: 2024-12-19
Payer: COMMERCIAL

## 2024-12-16 PROCEDURE — 97110 THERAPEUTIC EXERCISES: CPT | Performed by: PHYSICAL THERAPIST

## 2024-12-16 PROCEDURE — 97112 NEUROMUSCULAR REEDUCATION: CPT | Performed by: PHYSICAL THERAPIST

## 2024-12-16 NOTE — PROGRESS NOTES
PHYSICAL THERAPY - MEDICARE DAILY TREATMENT NOTE (updated 3/23)      Date: 2024          Patient Name:  David Landin :  1975   Medical   Diagnosis:  Bilateral foot-drop [M21.371, M21.372] Treatment Diagnosis:  M62.81  GENERAL MUSCLE WEAKNESS, R26.81   Unsteadiness on feet, and R26.89   Abnormalities of gait and mobility M79.671  RIGHT FOOT PAIN and M79.672  LEFT FOOT PAIN R27.8     Other lack of coordination    Referral Source:  Anibal Holland MD Insurance:   Payor: BCBS / Plan: BCBS OUT OF STATE / Product Type: *No Product type* /                     Patient  verified yes     Visit #   Current  / Total 2 24   Time   In / Out 8:45a 9:25a   Total Treatment Time 40   Total Timed Codes 40   1:1 Treatment Time 40      [facility] Totals Reminder:  bill using total billable   min of TIMED therapeutic procedures and modalities.   8-22 min = 1 unit; 23-37 min = 2 units; 38-52 min = 3 units; 53-67 min = 4 units; 68-82 min = 5 units            SUBJECTIVE    Pain Level (0-10 scale): 0    Any medication changes, allergies to medications, adverse drug reactions, diagnosis change, or new procedure performed?: [x] No    [] Yes (see summary sheet for update)  Medications: Verified on Patient Summary List    Subjective functional status/changes:     Pt reporting follow through with HEP. Noting little progress since initial visit.     OBJECTIVE      Therapeutic Procedures:  Tx Min Billable or 1:1 Min (if diff from Tx Min) Procedure, Rationale, Specifics   30  24432 Therapeutic Exercise (timed):  increase ROM, strength, coordination, balance, and proprioception to improve patient's ability to progress to PLOF and address remaining functional goals. (see flow sheet as applicable)     Details if applicable:     15  49139 Neuromuscular Re-Education (timed):  improve balance, coordination, kinesthetic sense, posture, core stability and proprioception to improve patient's ability to develop conscious control of individual

## 2024-12-24 DIAGNOSIS — E11.65 TYPE 2 DIABETES MELLITUS WITH HYPERGLYCEMIA (HCC): ICD-10-CM

## 2024-12-24 RX ORDER — GLIPIZIDE 5 MG/1
5 TABLET ORAL 2 TIMES DAILY
Qty: 180 TABLET | Refills: 3 | Status: SHIPPED | OUTPATIENT
Start: 2024-12-24

## 2024-12-24 NOTE — TELEPHONE ENCOUNTER
Medication Refill Request    David Landin is requesting a refill of the following medication(s):   Requested Prescriptions     Pending Prescriptions Disp Refills    glipiZIDE (GLUCOTROL) 5 MG tablet [Pharmacy Med Name: GLIPIZIDE 5 MG TABLET] 180 tablet 3     Sig: TAKE 1 TABLET BY MOUTH TWICE A DAY     Hemoglobin A1C   Date Value Ref Range Status   04/19/2024 5.7 (H) 4.0 - 5.6 % Final     Comment:     (NOTE)  HbA1C Interpretive Ranges  <5.7              Normal  5.7 - 6.4         Consider Prediabetes  >6.5              Consider Diabetes       Hemoglobin A1C, POC   Date Value Ref Range Status   11/22/2024 6.0 % Final        Listed PCP is Anibal Holland MD   Last provider to prescribe medication: Dr. Holland on 12/27/23  Date of Last Office Visit at [facility]: 11/22/24 with Dr. Holland    FUTURE APPOINTMENT:   Future Appointments   Date Time Provider Department Center   12/27/2024  9:15 AM Abdiel Warren, PT [facility]   1/3/2025  7:00 AM Jett Jennings [facility]   1/10/2025  9:15 AM Jett Jennings PTA [facility]   1/17/2025  7:00 AM Abdiel Warren, PT [facility]   1/24/2025  7:00 AM Jett Jennings [facility]   1/31/2025  7:00 AM Abdiel Warren PT [facility]   2/21/2025  2:00 PM LAB [facility] ECC DEP       Please send refill to:    [facility]/pharmacy #[id number] - [place], [place] - [address] - P [phone] - F [phone]  [address] [place] [address]  Phone: [phone] Fax: [phone]    Please review request and approve or deny with recommendations within 48 hours.

## 2024-12-27 ENCOUNTER — HOSPITAL ENCOUNTER (OUTPATIENT)
Facility: HOSPITAL | Age: 49
Setting detail: RECURRING SERIES
Discharge: HOME | End: 2024-12-30
Payer: COMMERCIAL

## 2024-12-27 PROCEDURE — 97112 NEUROMUSCULAR REEDUCATION: CPT | Performed by: PHYSICAL THERAPIST

## 2024-12-27 PROCEDURE — 97110 THERAPEUTIC EXERCISES: CPT | Performed by: PHYSICAL THERAPIST

## 2024-12-27 NOTE — PROGRESS NOTES
PHYSICAL THERAPY - MEDICARE DAILY TREATMENT NOTE (updated 3/23)      Date: 2024          Patient Name:  David Landin :  1975   Medical   Diagnosis:  Bilateral foot-drop [M21.371, M21.372] Treatment Diagnosis:  M62.81  GENERAL MUSCLE WEAKNESS, R26.81   Unsteadiness on feet, and R26.89   Abnormalities of gait and mobility M79.671  RIGHT FOOT PAIN and M79.672  LEFT FOOT PAIN R27.8     Other lack of coordination    Referral Source:  Anibal Holland MD Insurance:   Payor: BCBS / Plan: BCBS OUT OF STATE / Product Type: *No Product type* /                     Patient  verified yes     Visit #   Current  / Total 3 24   Time   In / Out 9:15a 10:00a   Total Treatment Time 45   Total Timed Codes 45   1:1 Treatment Time 45      [facility] Totals Reminder:  bill using total billable   min of TIMED therapeutic procedures and modalities.   8-22 min = 1 unit; 23-37 min = 2 units; 38-52 min = 3 units; 53-67 min = 4 units; 68-82 min = 5 units            SUBJECTIVE    Pain Level (0-10 scale): 0    Any medication changes, allergies to medications, adverse drug reactions, diagnosis change, or new procedure performed?: [x] No    [] Yes (see summary sheet for update)  Medications: Verified on Patient Summary List    Subjective functional status/changes:     Pt cont to report good follow through with HEP.     OBJECTIVE      Therapeutic Procedures:  Tx Min Billable or 1:1 Min (if diff from Tx Min) Procedure, Rationale, Specifics   58 30244 Therapeutic Exercise (timed):  increase ROM, strength, coordination, balance, and proprioception to improve patient's ability to progress to PLOF and address remaining functional goals. (see flow sheet as applicable)     Details if applicable:     82 59839 Neuromuscular Re-Education (timed):  improve balance, coordination, kinesthetic sense, posture, core stability and proprioception to improve patient's ability to develop conscious control of individual muscles and awareness of

## 2025-01-03 ENCOUNTER — HOSPITAL ENCOUNTER (OUTPATIENT)
Facility: HOSPITAL | Age: 50
Setting detail: RECURRING SERIES
Discharge: HOME | End: 2025-01-06
Payer: COMMERCIAL

## 2025-01-03 PROCEDURE — 97112 NEUROMUSCULAR REEDUCATION: CPT

## 2025-01-03 PROCEDURE — 97110 THERAPEUTIC EXERCISES: CPT

## 2025-01-03 NOTE — PROGRESS NOTES
PHYSICAL THERAPY - MEDICARE DAILY TREATMENT NOTE (updated 3/23)      Date: 1/3/2025          Patient Name:  David Landin :  1975   Medical   Diagnosis:  Bilateral foot-drop [M21.371, M21.372] Treatment Diagnosis:  M62.81  GENERAL MUSCLE WEAKNESS, R26.81   Unsteadiness on feet, and R26.89   Abnormalities of gait and mobility M79.671  RIGHT FOOT PAIN and M79.672  LEFT FOOT PAIN R27.8     Other lack of coordination    Referral Source:  Anibal Holland MD Insurance:   Payor: BCBS / Plan: BCBS OUT OF STATE / Product Type: *No Product type* /                     Patient  verified yes     Visit #   Current  / Total 4 24   Time   In / Out 7:00 a 7:45 a   Total Treatment Time 45   Total Timed Codes 45   1:1 Treatment Time 45      [facility] Totals Reminder:  bill using total billable   min of TIMED therapeutic procedures and modalities.   8-22 min = 1 unit; 23-37 min = 2 units; 38-52 min = 3 units; 53-67 min = 4 units; 68-82 min = 5 units            SUBJECTIVE    Pain Level (0-10 scale): 0    Any medication changes, allergies to medications, adverse drug reactions, diagnosis change, or new procedure performed?: [x] No    [] Yes (see summary sheet for update)  Medications: Verified on Patient Summary List    Subjective functional status/changes:     Pt reporting gradual improvements at this time with no new complaints.     OBJECTIVE      Therapeutic Procedures:  Tx Min Billable or 1:1 Min (if diff from Tx Min) Procedure, Rationale, Specifics   15  63044 Therapeutic Exercise (timed):  increase ROM, strength, coordination, balance, and proprioception to improve patient's ability to progress to PLOF and address remaining functional goals. (see flow sheet as applicable)     Details if applicable:     27 35112 Neuromuscular Re-Education (timed):  improve balance, coordination, kinesthetic sense, posture, core stability and proprioception to improve patient's ability to develop conscious control of individual muscles

## 2025-01-10 ENCOUNTER — HOSPITAL ENCOUNTER (OUTPATIENT)
Facility: HOSPITAL | Age: 50
Setting detail: RECURRING SERIES
Discharge: HOME | End: 2025-01-13
Payer: COMMERCIAL

## 2025-01-10 PROCEDURE — 97110 THERAPEUTIC EXERCISES: CPT

## 2025-01-10 PROCEDURE — 97112 NEUROMUSCULAR REEDUCATION: CPT

## 2025-01-10 NOTE — PROGRESS NOTES
PHYSICAL THERAPY - MEDICARE DAILY TREATMENT NOTE (updated 3/23)      Date: 1/10/2025          Patient Name:  David Landin :  1975   Medical   Diagnosis:  Bilateral foot-drop [M21.371, M21.372] Treatment Diagnosis:  M62.81  GENERAL MUSCLE WEAKNESS, R26.81   Unsteadiness on feet, and R26.89   Abnormalities of gait and mobility M79.671  RIGHT FOOT PAIN and M79.672  LEFT FOOT PAIN R27.8     Other lack of coordination    Referral Source:  Anibal Holland MD Insurance:   Payor: BCBS / Plan: BCBS OUT OF STATE / Product Type: *No Product type* /                     Patient  verified yes     Visit #   Current  / Total 5 24   Time   In / Out 9:20 a 10:00 a   Total Treatment Time 40   Total Timed Codes 40   1:1 Treatment Time 40      [facility] Totals Reminder:  bill using total billable   min of TIMED therapeutic procedures and modalities.   8-22 min = 1 unit; 23-37 min = 2 units; 38-52 min = 3 units; 53-67 min = 4 units; 68-82 min = 5 units            SUBJECTIVE    Pain Level (0-10 scale): 0    Any medication changes, allergies to medications, adverse drug reactions, diagnosis change, or new procedure performed?: [x] No    [] Yes (see summary sheet for update)  Medications: Verified on Patient Summary List    Subjective functional status/changes:     Pt reporting he felt good after his last visit. Has had some difficulty navigating icy parking lot at work this week. All HEP is going well and pt reports he feels like his balance has improved slightly since his IE.     OBJECTIVE      Therapeutic Procedures:  Tx Min Billable or 1:1 Min (if diff from Tx Min) Procedure, Rationale, Specifics   15  13904 Therapeutic Exercise (timed):  increase ROM, strength, coordination, balance, and proprioception to improve patient's ability to progress to PLOF and address remaining functional goals. (see flow sheet as applicable)     Details if applicable:     25  48935 Neuromuscular Re-Education (timed):  improve balance,

## 2025-01-17 ENCOUNTER — HOSPITAL ENCOUNTER (OUTPATIENT)
Facility: HOSPITAL | Age: 50
Setting detail: RECURRING SERIES
Discharge: HOME | End: 2025-01-20
Payer: COMMERCIAL

## 2025-01-17 PROCEDURE — 97110 THERAPEUTIC EXERCISES: CPT | Performed by: PHYSICAL THERAPIST

## 2025-01-17 PROCEDURE — 97112 NEUROMUSCULAR REEDUCATION: CPT | Performed by: PHYSICAL THERAPIST

## 2025-01-17 NOTE — PROGRESS NOTES
PHYSICAL THERAPY - MEDICARE DAILY TREATMENT NOTE (updated 3/23)      Date: 2025          Patient Name:  David Landin :  1975   Medical   Diagnosis:  Bilateral foot-drop [M21.371, M21.372] Treatment Diagnosis:  M62.81  GENERAL MUSCLE WEAKNESS, R26.81   Unsteadiness on feet, and R26.89   Abnormalities of gait and mobility M79.671  RIGHT FOOT PAIN and M79.672  LEFT FOOT PAIN R27.8     Other lack of coordination    Referral Source:  Anibal Holland MD Insurance:   Payor: BCBS / Plan: BCBS OUT OF STATE / Product Type: *No Product type* /                     Patient  verified yes     Visit #   Current  / Total 6 24   Time   In / Out 7:45a 8:30a   Total Treatment Time 45   Total Timed Codes 45   1:1 Treatment Time 45      [facility] Totals Reminder:  bill using total billable   min of TIMED therapeutic procedures and modalities.   8-22 min = 1 unit; 23-37 min = 2 units; 38-52 min = 3 units; 53-67 min = 4 units; 68-82 min = 5 units            SUBJECTIVE    Pain Level (0-10 scale): 0    Any medication changes, allergies to medications, adverse drug reactions, diagnosis change, or new procedure performed?: [x] No    [] Yes (see summary sheet for update)  Medications: Verified on Patient Summary List    Subjective functional status/changes:     Pt cont to report gradual improvements in balance and control    OBJECTIVE      Therapeutic Procedures:  Tx Min Billable or 1:1 Min (if diff from Tx Min) Procedure, Rationale, Specifics   15  96742 Therapeutic Exercise (timed):  increase ROM, strength, coordination, balance, and proprioception to improve patient's ability to progress to PLOF and address remaining functional goals. (see flow sheet as applicable)     Details if applicable:     30  95737 Neuromuscular Re-Education (timed):  improve balance, coordination, kinesthetic sense, posture, core stability and proprioception to improve patient's ability to develop conscious control of individual muscles and awareness

## 2025-01-23 ENCOUNTER — OFFICE VISIT (OUTPATIENT)
Age: 50
End: 2025-01-23
Payer: COMMERCIAL

## 2025-01-23 VITALS
WEIGHT: 157.2 LBS | TEMPERATURE: 97.8 F | BODY MASS INDEX: 21.29 KG/M2 | RESPIRATION RATE: 18 BRPM | DIASTOLIC BLOOD PRESSURE: 68 MMHG | SYSTOLIC BLOOD PRESSURE: 115 MMHG | HEART RATE: 83 BPM | HEIGHT: 72 IN | OXYGEN SATURATION: 96 %

## 2025-01-23 DIAGNOSIS — M25.512 LEFT SHOULDER PAIN, UNSPECIFIED CHRONICITY: Primary | ICD-10-CM

## 2025-01-23 DIAGNOSIS — M25.511 RIGHT SHOULDER PAIN, UNSPECIFIED CHRONICITY: ICD-10-CM

## 2025-01-23 PROCEDURE — 99214 OFFICE O/P EST MOD 30 MIN: CPT | Performed by: FAMILY MEDICINE

## 2025-01-23 SDOH — ECONOMIC STABILITY: FOOD INSECURITY: WITHIN THE PAST 12 MONTHS, YOU WORRIED THAT YOUR FOOD WOULD RUN OUT BEFORE YOU GOT MONEY TO BUY MORE.: NEVER TRUE

## 2025-01-23 SDOH — ECONOMIC STABILITY: FOOD INSECURITY: WITHIN THE PAST 12 MONTHS, THE FOOD YOU BOUGHT JUST DIDN'T LAST AND YOU DIDN'T HAVE MONEY TO GET MORE.: NEVER TRUE

## 2025-01-23 ASSESSMENT — PATIENT HEALTH QUESTIONNAIRE - PHQ9
SUM OF ALL RESPONSES TO PHQ QUESTIONS 1-9: 0
2. FEELING DOWN, DEPRESSED OR HOPELESS: NOT AT ALL
SUM OF ALL RESPONSES TO PHQ QUESTIONS 1-9: 0
1. LITTLE INTEREST OR PLEASURE IN DOING THINGS: NOT AT ALL
SUM OF ALL RESPONSES TO PHQ9 QUESTIONS 1 & 2: 0

## 2025-01-23 NOTE — PROGRESS NOTES
[facility]  [facility]   [facility]      Chief Complaint:   Chief Complaint   Patient presents with    Shoulder Pain     Bilateral shoulder       History of Present Illness     Patient Identification  David Landin is a 49 y.o. male complains of pain in the bilateral shoulder pain, L>R.     May 2021 he notes injury to the left shoulder trying to save a turtle on the road, tripped and fell onto his left elbow while running on the road. [place] a pop after the injury. Endorses chronic left shoulder pain since that time; worsening in the last 6 months. 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 Left shoulder worse than right at this time; he is using his right arm more. Pain is described as sharp pain in the left shoulder with radiation down his arm. Pain alleviated with ibuprofen/tylenol for pain with moderate relief. Pain worsened with laying flat in bed. He additionally notes injury in R shoulder years at ago at work while trying to catch a heavy box.       Past Medical History:   Diagnosis Date    Depression     not treated, not currently a problem    Diabetes (HCC)     type 2, recent diagnosis in 4/23    Poor dentition     4 oral surgeries in 2022 and 2023 to remove several teeth 14 total    Sinus congestion     left side of nasal passage blocked     Family History   Problem Relation Age of Onset    No Known Problems Mother     Hypertension Father      Current Outpatient Medications   Medication Sig Dispense Refill    glipiZIDE (GLUCOTROL) 5 MG tablet TAKE 1 TABLET BY MOUTH TWICE A  tablet 3    fluticasone (FLONASE) 50 MCG/ACT nasal spray 1 spray by Each Nostril route daily 32 g 1    Cholecalciferol (VITAMIN D3 PO) Take by mouth      Cyanocobalamin (B-12 PO) Take by mouth      metFORMIN (GLUCOPHAGE-XR) 500 MG extended release tablet TAKE 1 TABLET BY MOUTH IN THE MORNING AND IN THE

## 2025-01-23 NOTE — PROGRESS NOTES
Pt here today for bilateral shoulder pain, LT being a little worse. Pt states in May of 2021 fell on his LT shoulder, thinks it may have been dislocated, he was never seen. Pt states, \"it kind of heal but mostly recently the pain is back.\" Pt states the pain does travel down his arm to his elbow.    Identified pt with two pt identifiers(name and ). Reviewed record in preparation for visit and have obtained necessary documentation.  Chief Complaint   Patient presents with    Shoulder Pain     Bilateral shoulder        Vitals:    25 0849   BP: 115/68   Site: Right Upper Arm   Position: Sitting   Cuff Size: Medium Adult   Pulse: 83   Resp: 18   Temp: 97.8 °F (36.6 °C)   TempSrc: Oral   SpO2: 96%   Weight: 71.3 kg (157 lb 3.2 oz)   Height: 1.829 m (6')         Coordination of Care Questionnaire:  :     \"Have you been to the ER, urgent care clinic since your last visit?  Hospitalized since your last visit?\"    NO    “Have you seen or consulted any other health care providers outside of [facility] since your last visit?”    NO          Click Here for Release of Records Request

## 2025-01-24 ENCOUNTER — HOSPITAL ENCOUNTER (OUTPATIENT)
Facility: HOSPITAL | Age: 50
Setting detail: RECURRING SERIES
Discharge: HOME | End: 2025-01-27
Payer: COMMERCIAL

## 2025-01-24 PROCEDURE — 97110 THERAPEUTIC EXERCISES: CPT

## 2025-01-24 PROCEDURE — 97112 NEUROMUSCULAR REEDUCATION: CPT

## 2025-01-24 NOTE — PROGRESS NOTES
PHYSICAL THERAPY - MEDICARE DAILY TREATMENT NOTE (updated 3/23)      Date: 2025          Patient Name:  David Landin :  1975   Medical   Diagnosis:  Bilateral foot-drop [M21.371, M21.372] Treatment Diagnosis:  M62.81  GENERAL MUSCLE WEAKNESS, R26.81   Unsteadiness on feet, and R26.89   Abnormalities of gait and mobility M79.671  RIGHT FOOT PAIN and M79.672  LEFT FOOT PAIN R27.8     Other lack of coordination    Referral Source:  Anibal Holland MD Insurance:   Payor: BCBS / Plan: BCBS OUT OF STATE / Product Type: *No Product type* /                     Patient  verified yes     Visit #   Current  / Total 7 24   Time   In / Out 7:00 a 7:40a   Total Treatment Time 40   Total Timed Codes 40   1:1 Treatment Time 40      [facility] Totals Reminder:  bill using total billable   min of TIMED therapeutic procedures and modalities.   8-22 min = 1 unit; 23-37 min = 2 units; 38-52 min = 3 units; 53-67 min = 4 units; 68-82 min = 5 units            SUBJECTIVE    Pain Level (0-10 scale): 0    Any medication changes, allergies to medications, adverse drug reactions, diagnosis change, or new procedure performed?: [x] No    [] Yes (see summary sheet for update)  Medications: Verified on Patient Summary List    Subjective functional status/changes:     Pt cont to report gradual improvements in balance and control    OBJECTIVE      Therapeutic Procedures:  Tx Min Billable or 1:1 Min (if diff from Tx Min) Procedure, Rationale, Specifics   25  31810 Therapeutic Exercise (timed):  increase ROM, strength, coordination, balance, and proprioception to improve patient's ability to progress to PLOF and address remaining functional goals. (see flow sheet as applicable)     Details if applicable:     15  50829 Neuromuscular Re-Education (timed):  improve balance, coordination, kinesthetic sense, posture, core stability and proprioception to improve patient's ability to develop conscious control of individual muscles and

## 2025-01-31 ENCOUNTER — APPOINTMENT (OUTPATIENT)
Facility: HOSPITAL | Age: 50
End: 2025-01-31
Payer: COMMERCIAL

## 2025-02-21 ENCOUNTER — LAB (OUTPATIENT)
Age: 50
End: 2025-02-21

## 2025-02-21 DIAGNOSIS — E11.65 TYPE 2 DIABETES MELLITUS WITH HYPERGLYCEMIA, WITHOUT LONG-TERM CURRENT USE OF INSULIN (HCC): ICD-10-CM

## 2025-02-21 LAB — HBA1C MFR BLD: 6.4 %

## 2025-02-22 LAB
CHOLEST SERPL-MCNC: 143 MG/DL
HDLC SERPL-MCNC: 44 MG/DL
HDLC SERPL: 3.3 (ref 0–5)
LDLC SERPL CALC-MCNC: 51.4 MG/DL (ref 0–100)
TRIGL SERPL-MCNC: 238 MG/DL
VLDLC SERPL CALC-MCNC: 47.6 MG/DL

## 2025-03-16 DIAGNOSIS — E11.65 TYPE 2 DIABETES MELLITUS WITH HYPERGLYCEMIA, WITHOUT LONG-TERM CURRENT USE OF INSULIN (HCC): ICD-10-CM

## 2025-03-17 RX ORDER — METFORMIN HYDROCHLORIDE 500 MG/1
TABLET, EXTENDED RELEASE ORAL
Qty: 180 TABLET | Refills: 3 | Status: SHIPPED | OUTPATIENT
Start: 2025-03-17

## 2025-03-17 NOTE — TELEPHONE ENCOUNTER
Medication Refill Request    David Landin is requesting a refill of the following medication(s):   Requested Prescriptions     Pending Prescriptions Disp Refills    metFORMIN (GLUCOPHAGE-XR) 500 MG extended release tablet [Pharmacy Med Name: METFORMIN HCL  MG TABLET] 180 tablet 3     Sig: TAKE 1 TABLET BY MOUTH TWICE A DAY IN THE MORNING AND IN THE EVENING        Listed PCP is Anibal Holland MD   Last provider to prescribe medication: Dr. Holland on 3/8/24  Date of Last Office Visit at [facility]: 1/23/2025 with Dr. Robles    FUTURE [facility] APPOINTMENT: Visit date not found    Please send refill to:    [id number] - [place], [place] - [address] - P [phone] - F [phone]  [address] [place] [address]  Phone: [phone] Fax: [phone]    [id number] - [place], [place] - [address] - P [phone] - F [phone]  [address] [place] [address]  Phone: [phone] Fax: [phone]      Please review request and approve or deny with recommendations within 48 hours.

## 2025-06-13 ENCOUNTER — OFFICE VISIT (OUTPATIENT)
Age: 50
End: 2025-06-13

## 2025-06-13 VITALS
HEART RATE: 58 BPM | BODY MASS INDEX: 21.94 KG/M2 | HEIGHT: 72 IN | DIASTOLIC BLOOD PRESSURE: 66 MMHG | OXYGEN SATURATION: 95 % | SYSTOLIC BLOOD PRESSURE: 105 MMHG | WEIGHT: 162 LBS | TEMPERATURE: 98.3 F

## 2025-06-13 DIAGNOSIS — E11.65 TYPE 2 DIABETES MELLITUS WITH HYPERGLYCEMIA, WITHOUT LONG-TERM CURRENT USE OF INSULIN (HCC): Primary | ICD-10-CM

## 2025-06-13 DIAGNOSIS — R07.9 CHEST PAIN, UNSPECIFIED TYPE: ICD-10-CM

## 2025-06-13 DIAGNOSIS — R06.02 SOB (SHORTNESS OF BREATH) ON EXERTION: ICD-10-CM

## 2025-06-13 LAB — HBA1C MFR BLD: 6.8 %

## 2025-06-13 RX ORDER — ASCORBIC ACID 500 MG
500 TABLET ORAL DAILY
COMMUNITY

## 2025-06-13 NOTE — PROGRESS NOTES
David Landin is a 49 y.o. male      Chief Complaint   Patient presents with    Follow-up     Diabetes-requesting POC A1C and no bloodwork today.  Traveling [place] this summer-[place].      Faxed electronic medical records request to Dr. Briceño office.  \"Have you been to the ER, urgent care clinic since your last visit?  Hospitalized since your last visit?\"    NO    “Have you seen or consulted any other health care providers outside of [facility] since your last visit?”    NO        “Have you had a colorectal cancer screening such as a colonoscopy/FIT/Cologuard?    YES - Type: Colonoscopy - Where: Dr. Jade 7/24. Nurse/CMA to request most recent records if not in the chart     No colonoscopy on file  No cologuard on file  No FIT/FOBT on file   No flexible sigmoidoscopy on file         Click Here for Release of Records Request    Vitals:    06/13/25 1259   BP: 105/66   BP Site: Right Upper Arm   Patient Position: Sitting   BP Cuff Size: Medium Adult   Pulse: 58   Temp: 98.3 °F (36.8 °C)   TempSrc: Oral   SpO2: 95%   Weight: 73.5 kg (162 lb)   Height: 1.829 m (6' 0.01\")           Medication Reconciliation Completed, changes notes. Please Update medication list.

## 2025-06-13 NOTE — PROGRESS NOTES
[facility]  [address] [place] [address]   Office [phone], Fax [phone]      Chief Complaint:     Chief Complaint   Patient presents with    Follow-up     Diabetes-requesting POC A1C and no bloodwork today.  Traveling overseas this summer-[place].        SUBJECTIVE  David Landin is an 49 y.o. male with h/o T2DM who presents T2DM management    #T2DM  - last POC A1c on 2/21/25 of 6.4  - lipid panel with LDL of 51.4  - medication: glpizide 5 BID and metformin [id number] daily  - denies polyuria, polydipsia, chest pain, new or worsening SOB  - last eye exam: last was x1 month ago  - walks 10 miles during work each day    SOB  - he reports ongoing SOB  - he has been evaluated by pulmonology and ENT for this without relief in his symptoms  - chest pain. This is less frequently. He reports that this is exertional.  - his SOB and chest pain will resolve with rest  - this occurs more at work  - at home, he is able to mow his lawn without any symptoms  - no symptoms currently    #HM  - due for PCV 20, DTaP per review of [facility]  - due for colon cancer screening at [facility] (Dr. Jade) - reports obtained in July of 2024 - records request sent    Allergies   No Known Allergies      Medications   Current Outpatient Medications   Medication Sig    ascorbic acid (VITAMIN C) 500 MG tablet Take 1 tablet by mouth daily    metFORMIN (GLUCOPHAGE-XR) 500 MG extended release tablet TAKE 1 TABLET BY MOUTH TWICE A DAY IN THE MORNING AND IN THE EVENING (Patient taking differently: 2 tablets 2 tablets in the morning)    glipiZIDE (GLUCOTROL) 5 MG tablet TAKE 1 TABLET BY MOUTH TWICE A DAY    Cholecalciferol (VITAMIN D3) 250 MCG (86430 UT) TABS Take by mouth    Cyanocobalamin (B-12 PO) Take by mouth    Magnesium 400 MG TABS Take by mouth    fluticasone (FLONASE) 50 MCG/ACT nasal spray 1 spray by Each Nostril route daily (Patient not taking: Reported on 6/13/2025)     No current facility-administered

## 2025-06-13 NOTE — PATIENT INSTRUCTIONS
- per review of our records, we would recommend pneumonia (PCV 20) and Dtap vaccination as part of routine vaccination  - if you have not been vaccinated for measles, hep A, and Hep B, these would also be recommended prior to travel  - call the provided number to schedule with the cardiologist  - you can use the following website to find a vaccination clinic: https://wwwnc.cdc.gov/travel/page/find-clinic

## 2025-06-15 ENCOUNTER — RESULTS FOLLOW-UP (OUTPATIENT)
Age: 50
End: 2025-06-15

## 2025-06-19 ENCOUNTER — TELEPHONE (OUTPATIENT)
Age: 50
End: 2025-06-19

## 2025-07-09 ENCOUNTER — TELEPHONE (OUTPATIENT)
Age: 50
End: 2025-07-09

## 2025-07-09 ENCOUNTER — OFFICE VISIT (OUTPATIENT)
Age: 50
End: 2025-07-09
Payer: COMMERCIAL

## 2025-07-09 VITALS
TEMPERATURE: 98 F | OXYGEN SATURATION: 97 % | HEIGHT: 72 IN | HEART RATE: 75 BPM | DIASTOLIC BLOOD PRESSURE: 75 MMHG | WEIGHT: 165.34 LBS | SYSTOLIC BLOOD PRESSURE: 125 MMHG | BODY MASS INDEX: 22.4 KG/M2 | RESPIRATION RATE: 18 BRPM

## 2025-07-09 DIAGNOSIS — Z71.84 COUNSELING ABOUT TRAVEL: ICD-10-CM

## 2025-07-09 DIAGNOSIS — M79.605 PAIN OF LEFT LOWER EXTREMITY: Primary | ICD-10-CM

## 2025-07-09 DIAGNOSIS — E11.65 TYPE 2 DIABETES MELLITUS WITH HYPERGLYCEMIA, WITHOUT LONG-TERM CURRENT USE OF INSULIN (HCC): ICD-10-CM

## 2025-07-09 DIAGNOSIS — E11.42 DIABETIC POLYNEUROPATHY ASSOCIATED WITH TYPE 2 DIABETES MELLITUS (HCC): ICD-10-CM

## 2025-07-09 PROCEDURE — 99213 OFFICE O/P EST LOW 20 MIN: CPT | Performed by: FAMILY MEDICINE

## 2025-07-09 PROCEDURE — 3044F HG A1C LEVEL LT 7.0%: CPT | Performed by: FAMILY MEDICINE

## 2025-07-09 ASSESSMENT — PATIENT HEALTH QUESTIONNAIRE - PHQ9
SUM OF ALL RESPONSES TO PHQ QUESTIONS 1-9: 2
SUM OF ALL RESPONSES TO PHQ QUESTIONS 1-9: 2
1. LITTLE INTEREST OR PLEASURE IN DOING THINGS: SEVERAL DAYS
SUM OF ALL RESPONSES TO PHQ QUESTIONS 1-9: 2
2. FEELING DOWN, DEPRESSED OR HOPELESS: SEVERAL DAYS
SUM OF ALL RESPONSES TO PHQ QUESTIONS 1-9: 2

## 2025-07-09 NOTE — TELEPHONE ENCOUNTER
Spoke with David Landin (self) who identified self with two patient identifiers (name and ).  On Release of Information Form? N/A, self    Patient Active Problem List   Diagnosis    Resting tremor    Weakness    Numbness and tingling in both hands    Nasal congestion    Type 2 diabetes mellitus with hyperglycemia, without long-term current use of insulin (HCC)    Bilateral foot-drop    Diabetic polyneuropathy associated with type 2 diabetes mellitus (HCC)       Past Surgical History:   Procedure Laterality Date    HERNIA REPAIR Bilateral     inguinal hernia    MOUTH SURGERY      teeth removed , teeth removed ,    NOSE SURGERY Bilateral 7/3/2023    SEPTOPLASTY, BILATERAL SUBMUCOUS RESECTION INFERIOR TURBINATES performed by Adolfo Hogan MD at [facility] AMBULATORY OR    WISDOM TOOTH EXTRACTION         Allergies:  No Known Allergies     Chief Complaint/Subjective:   Dx with DM2 after noticing Left foot weakness. Over last few years, able to get A1c under 7, but still having noted neuropathy.     Works for [employer] and earlier today was bending down and stood back up. Started to have pain and numbness from Left foot up to left hip. Slowed down and keep an eye on condition. Numbness did not go away, but still able to walk around without difficulty. Clocked out for lunch at 12PM and spoke with manager and informed management that he was going to go home.     Currently in sitting in car with sock off. Stated that color of foot is \"normal. Mostly pink.\"    Onset: 26     Current Symptoms:   Numbness  Left Leg    Associated Symptoms:        Yes No Comment(s)   Mentality Changes [] [x]    SOB [] [x]    Dyspnea [] [x]    Chest Pain [] [x]    Palpitations [] [x]    Tachycardia [] [x]    Headache [] [x]    Visual Changes [] [x]    Lightheadedness [] [x]    Dizziness [] [x]    Slurred Speech [] [x]    Nausea [] [x]    Vomiting [] [x]        Fever/Chills: NA    Location: radiating from the left foot, to the left

## 2025-07-09 NOTE — PROGRESS NOTES
Subjective:   David Landin is an 50 y.o. male who presents for  evaluation of the numbness and tingling in the left leg.    HPI  Chief Complaint   Patient presents with    Numbness     The patient reports having chronic numbness and tingling in his feet due to peripheral neuropathy however the symptoms significantly get worse earlier today.  He does work in [employer] and has been doing the lifting but then he bent over and then when he stood up he developed significant numbness and tingling in the left hand with episode of pain in the left leg.  He was able to carry the weight but wanted to get it checked.  Denies any knee pain or swelling of the foot or leg right now.  Of note in the past had extensive evaluation including EMGs for the peripheral neuropathy and for us to identify that neuropathy is likely related to the underlying diabetes which has been well-controlled.    The patient is worried about the blood clot and wanted to check with that since he is going to the trip very soon to [facility] and [place].    Allergies - reviewed:   No Known Allergies      Medications - reviewed:  Current Outpatient Medications   Medication Sig    ascorbic acid (VITAMIN C) 500 MG tablet Take 1 tablet by mouth daily    metFORMIN (GLUCOPHAGE-XR) 500 MG extended release tablet TAKE 1 TABLET BY MOUTH TWICE A DAY IN THE MORNING AND IN THE EVENING (Patient taking differently: Take 2 tablets by mouth every evening)    glipiZIDE (GLUCOTROL) 5 MG tablet TAKE 1 TABLET BY MOUTH TWICE A DAY    Cholecalciferol (VITAMIN D3) 250 MCG (77774 UT) TABS Take by mouth    Cyanocobalamin (B-12 PO) Take by mouth    Magnesium 400 MG TABS Take by mouth    fluticasone (FLONASE) 50 MCG/ACT nasal spray 1 spray by Each Nostril route daily (Patient not taking: Reported on 7/9/2025)     No current facility-administered medications for this visit.         Past Medical History - reviewed:  Past Medical History:   Diagnosis Date    Depression     not treated,

## 2025-07-09 NOTE — PROGRESS NOTES
Identified pt with two pt identifiers(name and ). Reviewed record in preparation for visit and have obtained necessary documentation.  Chief Complaint   Patient presents with    Numbness      Develop neuropathy in feet, especially left foot and lower leg, and hands. Left foot to hip numbness and tingling that never went away, remains but not as bad as earlier today. Describes discomfort remains in greater left toe.  Health Maintenance Due   Topic    HIV screen     Hepatitis B vaccine (1 of 3 - 19+ 3-dose series)    DTaP/Tdap/Td vaccine (1 - Tdap)    Pneumococcal 0-49 years Vaccine (1 of 2 - PCV)    Colorectal Cancer Screen     COVID-19 Vaccine ( season)    Diabetic retinal exam     Diabetic Alb to Cr ratio (uACR) test     GFR test (Diabetes, CKD 3-4, OR last GFR 15-59)        Vitals:    25 1522   BP: 125/75   BP Site: Right Upper Arm   Patient Position: Sitting   BP Cuff Size: Medium Adult   Pulse: 75   Resp: 18   Temp: 98 °F (36.7 °C)   TempSrc: Oral   SpO2: 97%   Weight: 75 kg (165 lb 5.5 oz)   Height: 1.829 m (6' 0.01\")         \"Have you been to the ER, urgent care clinic since your last visit?  Hospitalized since your last visit?\"    NO    “Have you seen or consulted any other health care providers outside of [facility] since your last visit?”    NO        “Have you had a colorectal cancer screening such as a colonoscopy/FIT/Cologuard?    YES - Type: Colonoscopy - Where: Gastroenterologist Dr Jade 2024 Nurse/CMA to request most recent records if not in the chart     No colonoscopy on file  No cologuard on file  No FIT/FOBT on file   No flexible sigmoidoscopy on file         Click Here for Release of Records Request     This patient is accompanied in the office by his self.  I have received verbal consent from David Landin to discuss any/all medical information while they are present in the room.

## 2025-07-09 NOTE — TELEPHONE ENCOUNTER
Pt called to request an appt, or to speak with someone before deciding to go to ER. He stated that he is a \"\" at [employer] which requires him to bend over a lot. He stated that earlier today, he bent over and when he stood up, he experienced numbness in LLE that has not went away.     There are no avail appts.

## 2025-07-11 LAB — D DIMER PPP FEU-MCNC: 0.23 MG/L FEU (ref 0–0.65)

## (undated) DEVICE — MINOR ENT-SFMCASU: Brand: MEDLINE INDUSTRIES, INC.

## (undated) DEVICE — GLOVE ORANGE PI 7 1/2   MSG9075

## (undated) DEVICE — REFLEX ULTRA PTR WITH INTEGRATED CABLE: Brand: COBLATION

## (undated) DEVICE — SOLUTION IRRIG 1000ML 0.9% SOD CHL USP POUR PLAS BTL

## (undated) DEVICE — SUTURE CHROMIC GUT SZ 4-0 L27IN ABSRB BRN L17MM RB-1 1/2 U203H

## (undated) DEVICE — Device